# Patient Record
Sex: MALE | Race: WHITE | NOT HISPANIC OR LATINO | Employment: OTHER | URBAN - NONMETROPOLITAN AREA
[De-identification: names, ages, dates, MRNs, and addresses within clinical notes are randomized per-mention and may not be internally consistent; named-entity substitution may affect disease eponyms.]

---

## 2017-04-25 ENCOUNTER — HOSPITAL ENCOUNTER (OUTPATIENT)
Dept: CARDIAC REHAB | Facility: HOSPITAL | Age: 57
Setting detail: THERAPIES SERIES
End: 2017-04-25
Attending: FAMILY MEDICINE
Payer: COMMERCIAL

## 2017-04-25 VITALS — HEIGHT: 70 IN | BODY MASS INDEX: 19.76 KG/M2 | WEIGHT: 138 LBS

## 2017-04-25 PROCEDURE — G0424 PULMONARY REHAB W EXER: HCPCS

## 2017-04-25 ASSESSMENT — DUKE ACTIVITY SCORE INDEX (DASI)
VO2_PEAK: 23.45
DASI METS SCORE: 6.7

## 2017-04-25 ASSESSMENT — PULMONARY FUNCTION TESTS
FEV1/FVC: 33
FEV1: .97
FVC: 2.97
FEV1 (%PREDICTED): 26
FVC_PERCENT_PREDICTED: 61

## 2017-04-25 ASSESSMENT — 6 MINUTE WALK TEST (6MWT)
GENDER SELECTION: MALE
TOTAL DISTANCE WALKED: 356
MALE CALC: 635.39
FEMALE CALC: 563.27

## 2017-04-25 ASSESSMENT — ACTIVITIES OF DAILY LIVING (ADL): ADL_LIMITATIONS: STAIR CLIMBING;BATHING

## 2017-04-25 NOTE — PROGRESS NOTES
04/25/17 1200   Session   Session Initial Evaluation and Exercise Prescription   Certified through this date 05/24/17   Type Initial   General Information   Treatment Diagnosis COPD   Classification of COPD Stage 3 (Severe)   Onset Date 04/25/07   Hospital Location Not hospitalized   Current Signs and Symptoms SOB;Anxiety;Sleep   Comments Patient has sleep apnea, newly diagnosed   Outpatient Pulmonary Rehab Start Date 04/25/17   Primary Physician Young Souza   Pulmonolgist Keith Costello   General Information Comments Patient states he has scarring in his right lung   Medical History/Comorbidities   Medical History/Comorbidities Anxiety;COPD;Other (see comments);Hypertension;Insomnia;Obstructive sleep apnea  (Bad valve in his heart)   Untoward Events/Exacerbations/Hospitalizations   Untoward Events/Exacerbations/Hospitalizations None   Sputum   Sputum Production Amount small amount   Sputum Production Color Clear   Sputum Production Consistency Frothy   Tobacco History   Tobacco Current smoker, everyday   Tobacco Habit Cigarettes   Years Smoked 30   Average Packs Per Day .5   Interventions Planned Tobacco Cessation Aides;NA: Patient is in precontemplation for smoking   Medications   Short-Acting Beta Agonist Prescribed, taking as prescribed   Inhaled Corticosteroid Prescribed, taking as prescribed   Oral Corticosteroid Not prescribed   Medications Reconciled By Medical record;Patient   Preventative Vaccinations   Influenza Vaccination Yes   Pneumonia Vaccination No   Pain   Patient Currently in Pain No   Pain Comments Patient states he is not currently in any pain   Falls Screen   Have you fallen two or more times in the past year? No   Have you fallen and had an injury in the past year? No   Timed up and go score if applicable na   Referral initiated to physical therapy No   Comment Patient has not fallen nor is he a fall risk   Living and Work Status   Living Arrangements house   Support System Live with an  "adult   Environmental Factors Plants   ADL Limitations Stair climbing;Bathing   Initial Duke Activity Status Index (DASI) score. A measure of functional capacity. The goal is to have a pre-program raw score of 9.95 (~4 METs) or above 32.2   Initial DASI VO2 Peak (ml*kg-1*min-1) 23.45   Initial DASI MET Level 6.7   Occupation    Return to Employment No   Physical Assessments   Incisions Not applicable   Edema None   Left Lung Sounds diminished   Right Lung Sounds diminished   Comments Breath sounds are diminished but clear   Pulmonary Function Test (PFTs)   PFT Results Available   Date Completed 11/16/16   FVC Actual 2.97   % Predicted FVC 61   FEV1 Actual 0.97   % Predicted FEV1 26   FEV1/FEV Ratio 33   Pre/Post Bronchodilator Pre   Airway Obstruction Severe   Individualized Treatment Plan   Sessions Scheduled 18   Sessions Attended 1   Type Aerobic exercise;Resistance training;Flexibility training   Oxygen Use   Supplemental Oxygen Needed No   Interventions Recommended NA   Exercise Prescription   Mode Treadmill;Nustep;Arm Ergometer/UBE   Frequency 2 days/week   Duration/Time 30-45 min   THR (85% of age predicted max heart rate)  137.7   Effort Rating (0-10) 4-6/10   Progression of Exercise .5 METS as tolerated   Oxygen Titration with Exercise NA   Exercise Assessment   6 Minute Walk Predicted - Gender Selection Male   6 Minute Walk Predicted (Female) 563.27   6 Minute Walk Predicted (Male) 635.39   6 Minute Walk Distance (Initial) 356 Meters   Resting HR 93   Exercise    Resting /86   Exercise /80   SpO2 93   Exercise SpO2 86   Current MET level 2.7   Exercise Tolerance fair   Normal Limits Discussed Yes   Current Symptoms at Home Anxiety   Current Symptoms in Rehab Anxiety;Dyspnea   Limitations Normal limits discussed   Nutrition Management   Age 56   Height 1.778 m (5' 10\")   Weight 62.6 kg (138 lb)   BMI (Calculated) 19.84   Assessment Underweight   Goal weight 68 kg (150 " lb)   Interventions Planned Attend group nutrition class   Psychosocial   Initial Patient Health Questionnaire -9 (PHQ-9) for depression. To notify physician if pre-score >9. 2   Initial [facility] Survey score. A quality of life measure. To re evaluate if total score is > 25. Also consider PHQ-9 and DASI to determine if patient should be referred back to physician. 19   Initial Shortness of Breath Questionnaire (SOBQ) score. The goal is to reduce the score pre to post program. 52   Psychosocial Comments Patient lives with his wife and has a good support system   Stages of Change   Aerobic Exercise Contemplation   Physical Activity Contemplation   Recommended diet Contemplation   Stress Contemplation   Smoking Cessation Precontemplation   Oxygen Usage NA   Current Home Exercise   Type of Exercise None   Frequency (days per week) 0   Duration (minutes per session) 0   Recommended Home Exercise Prescription   Type of Exercise Walking   Frequency (Days per week) 3   Duration (minutes per session) 15-30 min   Effort Rating Recommended (0-10) Scale  4-6/10   Recommended Exercise Heart Rate Range 10 to 20 beats per minute above resting   30 Day Exercise Plan Advance 0.5 METs as tolerated by patient   Learning Assessment   Learner Patient   Primary Language English   Preferred Learning Style Listening;Demonstration   Barriers to Learning No barriers noted   Patient Education/Referrals   Education Recommended Advance Directives;Anatomy and Disease Process;Breathing Techniques;Emotional Aspects of CLD;Exercise Principles;Home Oxygen Equipment;Medication Overview;Nutrition;Panic and Anxiety   Referral(s) Recommended None   Follow-up/On-going Support   Provider follow-up needed on the following No follow-up needed   Pulmonary Rehab Goals   Pulmonary Rehab Goals 1;2;3   Goal 1   Goal Patient would like to be able to walk one mile without becoming short of breath   Target Date 07/25/17   Goal 2   Goal Patient would like to  gain 5 pounds   Target Date 07/25/17   Goal 3   Goal Patient would like to quit smoking   Target Date 07/25/17   Assessment   Assessment 4/25/2017 Keith is a patient who with this respiratory therapy she is hoping to increase his endurance for completing house hold activities without becoming so fatigued and short of breath.  According to the patient he has recently had another sleep study.  He does have JAYASHREE and has a home CPAP machine but does have difficulty sleeping.  According to patient he does have scarring on his right upper lobe.  Patient does continue to smoke but has cut down to .5 ppd from 1 ppd.  Patient will start respiratory therapy on May 2 to assist him with monitoring and assessment of O2 needs as well as additional education pertaining to CLD.   I have reviewed initial evaluation outcomes including patient's response to initial activity assessment, and agree with exercise prescription for pulmonary rehabilitation for this patient.

## 2017-05-02 ENCOUNTER — HOSPITAL ENCOUNTER (OUTPATIENT)
Dept: CARDIAC REHAB | Facility: HOSPITAL | Age: 57
Setting detail: THERAPIES SERIES
End: 2017-05-02
Attending: FAMILY MEDICINE
Payer: COMMERCIAL

## 2017-05-02 VITALS — WEIGHT: 135 LBS | BODY MASS INDEX: 19.37 KG/M2

## 2017-05-02 PROCEDURE — G0424 PULMONARY REHAB W EXER: HCPCS

## 2017-05-04 ENCOUNTER — HOSPITAL ENCOUNTER (OUTPATIENT)
Dept: CARDIAC REHAB | Facility: HOSPITAL | Age: 57
Setting detail: THERAPIES SERIES
End: 2017-05-04
Attending: FAMILY MEDICINE
Payer: COMMERCIAL

## 2017-05-04 VITALS — WEIGHT: 135 LBS | BODY MASS INDEX: 19.37 KG/M2

## 2017-05-04 PROCEDURE — G0424 PULMONARY REHAB W EXER: HCPCS

## 2017-05-09 ENCOUNTER — HOSPITAL ENCOUNTER (OUTPATIENT)
Dept: CARDIAC REHAB | Facility: HOSPITAL | Age: 57
Setting detail: THERAPIES SERIES
End: 2017-05-09
Attending: FAMILY MEDICINE
Payer: COMMERCIAL

## 2017-05-09 VITALS — BODY MASS INDEX: 19.23 KG/M2 | WEIGHT: 134 LBS

## 2017-05-09 PROCEDURE — G0424 PULMONARY REHAB W EXER: HCPCS

## 2017-05-11 ENCOUNTER — HOSPITAL ENCOUNTER (OUTPATIENT)
Dept: CARDIAC REHAB | Facility: HOSPITAL | Age: 57
Setting detail: THERAPIES SERIES
End: 2017-05-11
Attending: FAMILY MEDICINE
Payer: COMMERCIAL

## 2017-05-11 VITALS — WEIGHT: 135 LBS | BODY MASS INDEX: 19.37 KG/M2

## 2017-05-11 PROCEDURE — G0424 PULMONARY REHAB W EXER: HCPCS

## 2017-05-16 ENCOUNTER — HOSPITAL ENCOUNTER (OUTPATIENT)
Dept: CARDIAC REHAB | Facility: HOSPITAL | Age: 57
Setting detail: THERAPIES SERIES
End: 2017-05-16
Attending: FAMILY MEDICINE
Payer: COMMERCIAL

## 2017-05-16 VITALS — WEIGHT: 135 LBS | BODY MASS INDEX: 19.37 KG/M2

## 2017-05-16 PROCEDURE — G0424 PULMONARY REHAB W EXER: HCPCS

## 2017-05-23 ENCOUNTER — HOSPITAL ENCOUNTER (OUTPATIENT)
Dept: CARDIAC REHAB | Facility: HOSPITAL | Age: 57
Setting detail: THERAPIES SERIES
End: 2017-05-23
Attending: FAMILY MEDICINE
Payer: COMMERCIAL

## 2017-05-23 VITALS — BODY MASS INDEX: 19.23 KG/M2 | WEIGHT: 134 LBS

## 2017-05-23 PROCEDURE — G0424 PULMONARY REHAB W EXER: HCPCS

## 2017-05-25 ENCOUNTER — HOSPITAL ENCOUNTER (OUTPATIENT)
Dept: CARDIAC REHAB | Facility: HOSPITAL | Age: 57
Setting detail: THERAPIES SERIES
End: 2017-05-25
Attending: FAMILY MEDICINE
Payer: COMMERCIAL

## 2017-05-25 PROCEDURE — G0424 PULMONARY REHAB W EXER: HCPCS

## 2017-05-30 VITALS — HEIGHT: 70 IN | WEIGHT: 134 LBS | BODY MASS INDEX: 19.18 KG/M2

## 2017-05-30 ASSESSMENT — 6 MINUTE WALK TEST (6MWT)
MALE CALC: 638.59
FEMALE CALC: 567.44
GENDER SELECTION: MALE

## 2017-05-30 ASSESSMENT — DUKE ACTIVITY SCORE INDEX (DASI)
DASI METS SCORE: 6.7
VO2_PEAK: 23.45

## 2017-05-30 ASSESSMENT — PULMONARY FUNCTION TESTS
FEV1/FVC: 33
FEV1 (%PREDICTED): 26
FVC_PERCENT_PREDICTED: 61
FVC: 2.97
FEV1: .97

## 2017-05-30 ASSESSMENT — ACTIVITIES OF DAILY LIVING (ADL): ADL_LIMITATIONS: STAIR CLIMBING;BATHING

## 2017-05-30 NOTE — PROGRESS NOTES
05/30/17 0700   Session   Session 30 Day Individualized Treatment Plan   Certified through this date 06/28/17   Type Reassessment   General Information   Treatment Diagnosis COPD   Classification of COPD Stage 3 (Severe)   Onset Date 04/25/07   Hospital Location Not hospitalized   Current Signs and Symptoms SOB;Anxiety   Comments Patient has been having increased phlegm   Outpatient Pulmonary Rehab Start Date 04/25/17   Primary Physician Young Souza   Pulmonolgist Keith Costello   General Information Comments Patient states he has scarring in his right lung   Medical History/Comorbidities   Medical History/Comorbidities Anxiety;COPD;Other (see comments);Hypertension;Insomnia;Obstructive sleep apnea   Untoward Events/Exacerbations/Hospitalizations   Untoward Events/Exacerbations/Hospitalizations None   Sputum   Sputum Production Amount small amount   Sputum Production Color Clear   Sputum Production Consistency Thick   Tobacco History   Tobacco Current smoker, everyday   Tobacco Habit Cigarettes   Years Smoked 30   Average Packs Per Day .5   Interventions Planned Tobacco Cessation Aides;NA: Patient is in precontemplation for smoking   Medications   Short-Acting Beta Agonist Prescribed, taking as prescribed   Long-Acting Anticholinergic Not prescribed   Short-Acting Anticholinergic Not prescribed   Inhaled Corticosteroid Prescribed, taking as prescribed   Oral Corticosteroid Not prescribed   Medications Reconciled By Medical record;Patient   Preventative Vaccinations   Influenza Vaccination Yes   Pneumonia Vaccination No   Pain   Patient Currently in Pain No   Pain Comments Patient is not currently in pain   Falls Screen   Have you fallen two or more times in the past year? No   Have you fallen and had an injury in the past year? No   Timed up and go score if applicable na   Referral initiated to physical therapy No   Comment Patient is not a fall risk nor is he afraid of falling   Living and Work Status  "  Living Arrangements house   Support System Live with an adult   Environmental Factors Plants   ADL Limitations Stair climbing;Bathing   Initial Duke Activity Status Index (DASI) score. A measure of functional capacity. The goal is to have a pre-program raw score of 9.95 (~4 METs) or above 32.2   Initial DASI VO2 Peak (ml*kg-1*min-1) 23.45   Initial DASI MET Level 6.7   Occupation    Return to Employment No   Physical Assessments   Incisions Not applicable   Edema None   Left Lung Sounds diminished   Right Lung Sounds diminished   Comments Breath sounds are diminished but clear   Pulmonary Function Test (PFTs)   PFT Results Available   Date Completed 11/16/16   FVC Actual 2.97   % Predicted FVC 61   FEV1 Actual 0.97   % Predicted FEV1 26   FEV1/FEV Ratio 33   Pre/Post Bronchodilator Pre   Airway Obstruction Severe   Individualized Treatment Plan   Sessions Scheduled 18   Sessions Attended 8   Type Aerobic exercise;Resistance training;Flexibility training   Oxygen Use   Supplemental Oxygen Needed No   Interventions Recommended NA   Exercise Prescription   Mode Treadmill;Nustep;Arm Ergometer/UBE   Frequency 2 days/week   Duration/Time 30-45 min   THR (85% of age predicted max heart rate)  137.7   Effort Rating (0-10) 4-6/10   Progression of Exercise .5 METS as tolerated   Oxygen Titration with Exercise NA   Exercise Assessment   6 Minute Walk Predicted - Gender Selection Male   6 Minute Walk Predicted (Female) 567.44   6 Minute Walk Predicted (Male) 638.59   Resting HR 99   Exercise    Resting /82   Exercise /80   SpO2 98   Exercise SpO2 92   Current MET level 2.7   Exercise Tolerance fair   Normal Limits Discussed Yes   Current Symptoms at Home Anxiety   Current Symptoms in Rehab Anxiety;Dyspnea   Limitations Normal limits discussed   Nutrition Management   Age 56   Height 1.778 m (5' 10\")   Weight 60.8 kg (134 lb)   BMI (Calculated) 19.27   Assessment Underweight   Goal weight " 68 kg (150 lb)   Interventions Planned Attend group nutrition class   Psychosocial   Initial Patient Health Questionnaire -9 (PHQ-9) for depression. To notify physician if pre-score >9. 2   Initial [facility] [place] Survey score. A quality of life measure. To re evaluate if total score is > 25. Also consider PHQ-9 and DASI to determine if patient should be referred back to physician. 19   Initial Shortness of Breath Questionnaire (SOBQ) score. The goal is to reduce the score pre to post program. 52   Psychosocial Comments Patient lives with his wife and has a good support system   Stages of Change   Aerobic Exercise Contemplation   Physical Activity Contemplation   Recommended diet Contemplation   Stress Contemplation   Smoking Cessation Precontemplation   Oxygen Usage NA   Current Home Exercise   Type of Exercise None   Frequency (days per week) 0   Duration (minutes per session) 0   Recommended Home Exercise Prescription   Type of Exercise Walking   Frequency (Days per week) 3   Duration (minutes per session) 15-30 min   Effort Rating Recommended (0-10) Scale  4-6/10   Recommended Exercise Heart Rate Range 10-20 BPM above resting   30 Day Exercise Plan Advance 0.5 METs as tolerated by patient   Learning Assessment   Learner Patient   Primary Language English   Preferred Learning Style Listening;Demonstration   Barriers to Learning No barriers noted   Patient Education/Referrals   Education Recommended Advance Directives;Anatomy and Disease Process;Breathing Techniques;Emotional Aspects of CLD;Exercise Principles;Home Oxygen Equipment;Medication Overview;Nutrition;Panic and Anxiety   Referral(s) Recommended None   Follow-up/On-going Support   Provider follow-up needed on the following No follow-up needed   Pulmonary Rehab Goals   Pulmonary Rehab Goals 1;2;3   Goal 1   Goal Patient would like to be able to walk one mile without becoming short of breath   Target Date 07/25/17   Progress Towards Goal Patient has not  started exercising at home yet   Goal 2   Goal Patient would like to gain 5 pounds   Target Date 07/25/17   Progress Towards Goal Patient has lost one pound since starting PUlmonary Rehab   Goal 3   Goal Patient would like to quit smoking   Target Date 07/25/17   Progress Towards Goal Patient is still smoking a half pack per day   Assessment   Assessment 5/30/2017 Keith has attended 8 of his 18 exercise sessions and no education sessions.  He has not quit smoking yet nor has he started his home exercise.  He has difficulty with pursed lip breathing due to having a deviated septum.  Patient states he has more difficulty breathing with the humid weather and has increased thicker phlegm.  Patient has not attended any education sessions.  Patient will need to continue with Respiratory therapy to reinforce good breathing techniques as well as encourage attending education sessions.  Patient states he is not ready to quit smoking.  Skilled therapy is still needed to monitor O2 while exercising as well as reinforce breathing techniques.   I have reviewed and agree with this patient s individual treatment plan and exercise prescription for pulmonary rehabilitation.  Please see  individual treatment plan for details of progress and plan.

## 2017-06-13 ENCOUNTER — HOSPITAL ENCOUNTER (OUTPATIENT)
Dept: CARDIAC REHAB | Facility: HOSPITAL | Age: 57
Setting detail: THERAPIES SERIES
End: 2017-06-13
Attending: FAMILY MEDICINE
Payer: COMMERCIAL

## 2017-06-13 VITALS — WEIGHT: 134 LBS | BODY MASS INDEX: 19.23 KG/M2

## 2017-06-13 PROCEDURE — G0424 PULMONARY REHAB W EXER: HCPCS

## 2017-06-20 ENCOUNTER — HOSPITAL ENCOUNTER (OUTPATIENT)
Dept: CARDIAC REHAB | Facility: HOSPITAL | Age: 57
Setting detail: THERAPIES SERIES
End: 2017-06-20
Attending: FAMILY MEDICINE
Payer: COMMERCIAL

## 2017-06-20 VITALS — WEIGHT: 134 LBS | BODY MASS INDEX: 19.23 KG/M2

## 2017-06-20 PROCEDURE — G0424 PULMONARY REHAB W EXER: HCPCS

## 2017-06-22 ENCOUNTER — HOSPITAL ENCOUNTER (OUTPATIENT)
Dept: CARDIAC REHAB | Facility: HOSPITAL | Age: 57
Setting detail: THERAPIES SERIES
End: 2017-06-22
Attending: FAMILY MEDICINE
Payer: COMMERCIAL

## 2017-06-22 VITALS — WEIGHT: 134 LBS | BODY MASS INDEX: 19.23 KG/M2

## 2017-06-22 PROCEDURE — G0424 PULMONARY REHAB W EXER: HCPCS

## 2017-06-27 ENCOUNTER — HOSPITAL ENCOUNTER (OUTPATIENT)
Dept: CARDIAC REHAB | Facility: HOSPITAL | Age: 57
Setting detail: THERAPIES SERIES
End: 2017-06-27
Attending: FAMILY MEDICINE
Payer: COMMERCIAL

## 2017-06-27 VITALS — WEIGHT: 134 LBS | BODY MASS INDEX: 19.23 KG/M2

## 2017-06-27 PROCEDURE — G0424 PULMONARY REHAB W EXER: HCPCS

## 2017-06-29 ENCOUNTER — HOSPITAL ENCOUNTER (OUTPATIENT)
Dept: CARDIAC REHAB | Facility: HOSPITAL | Age: 57
Setting detail: THERAPIES SERIES
End: 2017-06-29
Attending: FAMILY MEDICINE
Payer: COMMERCIAL

## 2017-06-29 VITALS — WEIGHT: 134 LBS | HEIGHT: 70 IN | BODY MASS INDEX: 19.18 KG/M2

## 2017-06-29 PROCEDURE — G0424 PULMONARY REHAB W EXER: HCPCS

## 2017-06-29 ASSESSMENT — DUKE ACTIVITY SCORE INDEX (DASI)
TOTAL_SCORE: 24.2
DASI METS SCORE: 6.7
DASI METS SCORE: 5.72
VO2_PEAK: 20.01
VO2_PEAK: 23.45

## 2017-06-29 ASSESSMENT — PULMONARY FUNCTION TESTS
FEV1 (%PREDICTED): 26
FVC: 2.97
FVC_PERCENT_PREDICTED: 61
FEV1: .97
FEV1/FVC: 33

## 2017-06-29 ASSESSMENT — 6 MINUTE WALK TEST (6MWT)
MALE CALC: 638.59
FEMALE CALC: 567.44
TOTAL DISTANCE WALKED: 356
TOTAL DISTANCE WALKED: 402
GENDER SELECTION: MALE

## 2017-06-29 ASSESSMENT — ACTIVITIES OF DAILY LIVING (ADL): ADL_LIMITATIONS: STAIR CLIMBING

## 2017-06-29 NOTE — PROGRESS NOTES
06/29/17 1300   Session   Session Discharge Note   Certified through this date 07/28/17   Type Discharge/Follow-up   General Information   Treatment Diagnosis COPD   Classification of COPD Stage 3 (Severe)   Onset Date 04/25/07   Hospital Location Not hospitalized   Current Signs and Symptoms SOB   Comments Patient still has increased phlegm    Outpatient Pulmonary Rehab Start Date 04/25/17   Primary Physician Young Souza   Pulmonolgist Keith Costello   General Information Comments Patient states he has scarring in his right lung   Medical History/Comorbidities   Medical History/Comorbidities Anxiety;COPD;Other (see comments);Hypertension;Insomnia;Obstructive sleep apnea   Untoward Events/Exacerbations/Hospitalizations   Untoward Events/Exacerbations/Hospitalizations None   Sputum   Sputum Production Amount small amount   Sputum Production Color White;Clear   Sputum Production Consistency Thick   Tobacco History   Tobacco Current smoker, everyday   Tobacco Habit Cigarettes   Years Smoked 30   Average Packs Per Day .5   Quit Date or Planned Quit Date 07/04/17   Interventions Planned Develop Action Plan   Medications   Short-Acting Beta Agonist Prescribed, taking as prescribed   Long-Acting Anticholinergic Not prescribed   Short-Acting Anticholinergic Not prescribed   Inhaled Corticosteroid Prescribed, taking as prescribed   Oral Corticosteroid Not prescribed   Medications Reconciled By Medical record;Patient   Preventative Vaccinations   Influenza Vaccination Yes   Pneumonia Vaccination No   Pain   Patient Currently in Pain No   Pain Comments Patient is not currently in pain   Falls Screen   Have you fallen two or more times in the past year? No   Have you fallen and had an injury in the past year? No   Timed up and go score if applicable na   Sit to stand score if applicable .   Referral initiated to physical therapy No   Comment Patient is not a fall risk nor is he afraid of falling   Living and Work Status    Living Arrangements house   Support System Live with an adult   Environmental Factors Plants   ADL Limitations Stair climbing   Initial Duke Activity Status Index (DASI) score. A measure of functional capacity. The goal is to have a pre-program raw score of 9.95 (~4 METs) or above 32.2   Initial DASI VO2 Peak (ml*kg-1*min-1) 23.45   Initial DASI MET Level 6.7   Discharge DASI score 24.2   Discharge DASI VO2 Peak 20.01   Discharge DASI MET Level 5.72   DASI Comments/Recommendations Patient states that you can perceive your answers differently now compared to at the beginning of Rehab   Occupation    Return to Employment Retired;No   Physical Assessments   Incisions Not applicable   Edema None   Left Lung Sounds diminished   Right Lung Sounds diminished   Comments Breath sounds are diminished but clear   Pulmonary Function Test (PFTs)   PFT Results Available   Date Completed 11/16/16   FVC Actual 2.97   % Predicted FVC 61   FEV1 Actual 0.97   % Predicted FEV1 26   FEV1/FEV Ratio 33   Pre/Post Bronchodilator Pre   Airway Obstruction Severe   Individualized Treatment Plan   Sessions Scheduled 18   Sessions Attended 12   Type Aerobic exercise;Resistance training;Flexibility training   Oxygen Use   Supplemental Oxygen Needed No   Interventions Recommended NA   Exercise Prescription   Mode Treadmill;Nustep;Arm Ergometer/UBE   Frequency 2 days/week   Duration/Time 30-45 min   THR (85% of age predicted max heart rate)  137.7   Effort Rating (0-10) 4-6/10   Progression of Exercise .5 METS as tolerated   Oxygen Titration with Exercise NA   Comments Patient has not implemented a home exercise program but strongly encouraged.   Exercise Assessment   6 Minute Walk Predicted - Gender Selection Male   6 Minute Walk Predicted (Female) 567.44   6 Minute Walk Predicted (Male) 638.59   6 Minute Walk Distance (Initial) 356 Meters   6-min Walk Distance (Discharge) 402 Meters   Resting HR 94   Exercise   "  Resting /74   Exercise /78   SpO2 97   Exercise SpO2 91   Current MET level 2.93   Exercise Tolerance fair   Normal Limits Discussed Yes   Current Symptoms at Home Anxiety   Current Symptoms in Rehab Anxiety;Dyspnea   Limitations Normal limits discussed   Nutrition Management   Age 56   Height 1.778 m (5' 10\")   Weight 60.8 kg (134 lb)   BMI (Calculated) 19.27   Assessment Underweight   Goal weight 68 kg (150 lb)   Interventions Planned Attend group nutrition class   Psychosocial   Initial Patient Health Questionnaire -9 (PHQ-9) for depression. To notify physician if pre-score >9. 2   Initial [employer] Survey score. A quality of life measure. To re evaluate if total score is > 25. Also consider PHQ-9 and DASI to determine if patient should be referred back to physician. 19   Initial Shortness of Breath Questionnaire (SOBQ) score. The goal is to reduce the score pre to post program. 52   Discharge PHQ-9 for depression 2   Discharge [employer] Survey Score 21   Discharge SOBQ Score 52   Intervention Planned Use breathing techniques to control dyspnea cycle;Introduce relaxation techniques to assist with decreased anxiety   Interventions Completed Demonstrated ability to apply breathing techniques to control dyspnea cycle   Psychosocial Comments Patient lives with his wife and has a good support system   Stages of Change   Aerobic Exercise Contemplation   Physical Activity Preparation   Recommended diet Contemplation   Stress Contemplation   Smoking Cessation Preparation   Oxygen Usage NA   Current Home Exercise   Type of Exercise None   Frequency (days per week) 0   Duration (minutes per session) 0   Recommended Home Exercise Prescription   Type of Exercise Walking   Frequency (Days per week) 3   Duration (minutes per session) 15-30 min   Effort Rating Recommended (0-10) Scale  4-6/10   Recommended Exercise Heart Rate Range 10-20 bpm   Learning Assessment   Learner Patient   Primary Language " English   Preferred Learning Style Listening;Demonstration   Barriers to Learning No barriers noted   Patient Education/Referrals   Education Recommended Advance Directives;Anatomy and Disease Process;Breathing Techniques;Emotional Aspects of CLD;Exercise Principles;Home Oxygen Equipment;Medication Overview;Nutrition;Panic and Anxiety   Referral(s) Recommended None   Follow-up/On-going Support   Provider follow-up needed on the following No follow-up needed   Pulmonary Rehab Goals   Pulmonary Rehab Goals 1;2;3   Goal 1   Goal Patient would like to be able to walk one mile without becoming short of breath   Target Date 07/25/17   Date Met 06/29/17   Progress Towards Goal Patient feels like he could walk one mile at his own pace   Goal 2   Goal Patient would like to gain 5 pounds   Target Date 07/25/17   Progress Towards Goal Patient lost one pound during pulmonary rehab.  He is considering taking boost for a few extra calories   Goal 3   Goal Patient would like to quit smoking   Target Date 07/25/17   Progress Towards Goal Patient is currently still smoking one half pack per day but did set a quit date of 7/4/2017    Assessment   Assessment 6/29/2017 Keith has attended 13 of his 18 sessions but has requested to be discharged due to the fact that he will be retiring in 3 days and his insurance will be changing.  He did not attend any education classes while in rehab nor did he start doing exercises at home.  Setting up a home exercise program has been strongly encouraged.  Handouts were given on aerobic exercise, muscle conditioning, as well as stretches.  Patient has continued to smoke during pulmonary rehab but has set a quit date for July 4th, 2017 which is an improvement from beginning of rehab when patient did not want to quit smoking.  Patient states he will consider the WEL program and was given handouts and our phone number to call if he would like to start.  Patient did state he is not afraid to go to the  grocery store anymore and can control his breathing cycle through pursed lip breathing.     I have reviewed this patient s outcomes and self report of symptoms.  The patient has completed Pulmonary Rehabilitation and has made appropriate progress towards goals.  Please see individual treatment plan for details of attainment, discharge plan and independent exercise prescription.

## 2017-12-19 ENCOUNTER — TRANSFERRED RECORDS (OUTPATIENT)
Dept: HEALTH INFORMATION MANAGEMENT | Facility: HOSPITAL | Age: 57
End: 2017-12-19

## 2018-01-12 RX ORDER — BUDESONIDE AND FORMOTEROL FUMARATE DIHYDRATE 160; 4.5 UG/1; UG/1
2 AEROSOL RESPIRATORY (INHALATION) 2 TIMES DAILY
COMMUNITY

## 2018-01-12 RX ORDER — TIOTROPIUM BROMIDE 18 UG/1
18 CAPSULE ORAL; RESPIRATORY (INHALATION) DAILY
COMMUNITY

## 2018-01-12 RX ORDER — TAMSULOSIN HYDROCHLORIDE 0.4 MG/1
0.4 CAPSULE ORAL DAILY
COMMUNITY

## 2018-01-12 RX ORDER — ALBUTEROL SULFATE 5 MG/ML
2.5 SOLUTION RESPIRATORY (INHALATION) EVERY 6 HOURS PRN
COMMUNITY

## 2018-01-12 RX ORDER — ROFLUMILAST 500 UG/1
500 TABLET ORAL DAILY
COMMUNITY

## 2018-01-12 RX ORDER — ALBUTEROL SULFATE 90 UG/1
2 AEROSOL, METERED RESPIRATORY (INHALATION) EVERY 6 HOURS
COMMUNITY

## 2018-01-16 ENCOUNTER — ANESTHESIA EVENT (OUTPATIENT)
Dept: SURGERY | Facility: HOSPITAL | Age: 58
End: 2018-01-16
Payer: COMMERCIAL

## 2018-01-16 ASSESSMENT — LIFESTYLE VARIABLES: TOBACCO_USE: 1

## 2018-01-16 ASSESSMENT — COPD QUESTIONNAIRES: COPD: 1

## 2018-01-16 NOTE — ANESTHESIA PREPROCEDURE EVALUATION
Anesthesia Evaluation     . Pt has had prior anesthetic.     No history of anesthetic complications          ROS/MED HX    ENT/Pulmonary: Comment: Was on oxygen during respiratory illness in Nov and Dec.    (+)sleep apnea, JAYASHREE risk factors hypertension, tobacco use, Past use moderate COPD, doesn't use CPAP , . Other pulmonary disease emphysema.    Neurologic:  - neg neurologic ROS     Cardiovascular:     (+) Dyslipidemia, hypertension-range: took beta blocker last night, ---. : . . . :. valvular problems/murmurs type: MR moderate:. Previous cardiac testing Echodate:11/13results:EF= 60%date: results: date: results: date: results:          METS/Exercise Tolerance: Comment: Depends on the day    Hematologic:  - neg hematologic  ROS       Musculoskeletal:   (+) , , other musculoskeletal- degenerative lumbo sacral disease      GI/Hepatic:     (+) bowel prep, Other GI/Hepatic ETOHism      Renal/Genitourinary:  - ROS Renal section negative       Endo:  - neg endo ROS       Psychiatric:  - neg psychiatric ROS       Infectious Disease:  - neg infectious disease ROS       Malignancy:      - no malignancy   Other:    - neg other ROS                 Physical Exam      Airway   Mallampati: III  TM distance: <3 FB  Neck ROM: limited    Dental   (+) missing    Cardiovascular   Rhythm and rate: normal  (+) murmur       Pulmonary (+) decreased breath sounds                       Anesthesia Plan      History & Physical Review  History and physical reviewed and following examination; no interval change.    ASA Status:  3 .        Plan for MAC with Intravenous and Propofol induction. Maintenance will be TIVA.  Reason for MAC:  Deep or markedly invasive procedure (G8) and Chronic cardiopulmonary disease (G9)    Risks and benefits of MAC anesthetic discussed including dental damage, aspiration, loss of airway, conversion to general anesthetic, CV complications, MI, stroke, death. Pt wishes to proceed.       Postoperative  Care  Postoperative pain management:  IV analgesics.      Consents  Anesthetic plan, risks, benefits and alternatives discussed with:  Patient..                          .

## 2018-01-19 ENCOUNTER — HOSPITAL ENCOUNTER (OUTPATIENT)
Facility: HOSPITAL | Age: 58
Discharge: HOME | End: 2018-01-19
Attending: FAMILY MEDICINE | Admitting: FAMILY MEDICINE
Payer: COMMERCIAL

## 2018-01-19 ENCOUNTER — ANESTHESIA (OUTPATIENT)
Dept: SURGERY | Facility: HOSPITAL | Age: 58
End: 2018-01-19
Payer: COMMERCIAL

## 2018-01-19 ENCOUNTER — SURGERY (OUTPATIENT)
Age: 58
End: 2018-01-19

## 2018-01-19 VITALS
DIASTOLIC BLOOD PRESSURE: 78 MMHG | WEIGHT: 131.8 LBS | TEMPERATURE: 97 F | SYSTOLIC BLOOD PRESSURE: 136 MMHG | BODY MASS INDEX: 18.87 KG/M2 | HEIGHT: 70 IN | RESPIRATION RATE: 18 BRPM | OXYGEN SATURATION: 95 %

## 2018-01-19 PROCEDURE — 27210794 ZZH OR GENERAL SUPPLY STERILE: Performed by: FAMILY MEDICINE

## 2018-01-19 PROCEDURE — 01999 UNLISTED ANES PROCEDURE: CPT | Performed by: NURSE ANESTHETIST, CERTIFIED REGISTERED

## 2018-01-19 PROCEDURE — 88305 TISSUE EXAM BY PATHOLOGIST: CPT | Mod: TC | Performed by: FAMILY MEDICINE

## 2018-01-19 PROCEDURE — 25000128 H RX IP 250 OP 636: Performed by: NURSE ANESTHETIST, CERTIFIED REGISTERED

## 2018-01-19 PROCEDURE — 36000052 ZZH SURGERY LEVEL 2 EA 15 ADDTL MIN: Performed by: FAMILY MEDICINE

## 2018-01-19 PROCEDURE — 36000050 ZZH SURGERY LEVEL 2 1ST 30 MIN: Performed by: FAMILY MEDICINE

## 2018-01-19 PROCEDURE — 93010 ELECTROCARDIOGRAM REPORT: CPT | Performed by: INTERNAL MEDICINE

## 2018-01-19 PROCEDURE — 25000125 ZZHC RX 250: Performed by: NURSE ANESTHETIST, CERTIFIED REGISTERED

## 2018-01-19 PROCEDURE — 71000027 ZZH RECOVERY PHASE 2 EACH 15 MINS: Performed by: FAMILY MEDICINE

## 2018-01-19 PROCEDURE — 37000008 ZZH ANESTHESIA TECHNICAL FEE, 1ST 30 MIN: Performed by: FAMILY MEDICINE

## 2018-01-19 PROCEDURE — 40000305 ZZH STATISTIC PRE PROC ASSESS I: Performed by: FAMILY MEDICINE

## 2018-01-19 PROCEDURE — 37000009 ZZH ANESTHESIA TECHNICAL FEE, EACH ADDTL 15 MIN: Performed by: FAMILY MEDICINE

## 2018-01-19 PROCEDURE — 93005 ELECTROCARDIOGRAM TRACING: CPT | Mod: 59

## 2018-01-19 RX ORDER — NALOXONE HYDROCHLORIDE 0.4 MG/ML
.1-.4 INJECTION, SOLUTION INTRAMUSCULAR; INTRAVENOUS; SUBCUTANEOUS
Status: DISCONTINUED | OUTPATIENT
Start: 2018-01-19 | End: 2018-01-19 | Stop reason: HOSPADM

## 2018-01-19 RX ORDER — ONDANSETRON 2 MG/ML
4 INJECTION INTRAMUSCULAR; INTRAVENOUS EVERY 30 MIN PRN
Status: DISCONTINUED | OUTPATIENT
Start: 2018-01-19 | End: 2018-01-19 | Stop reason: HOSPADM

## 2018-01-19 RX ORDER — NALOXONE HYDROCHLORIDE 0.4 MG/ML
.1-.4 INJECTION, SOLUTION INTRAMUSCULAR; INTRAVENOUS; SUBCUTANEOUS
Status: CANCELLED | OUTPATIENT
Start: 2018-01-19 | End: 2018-01-20

## 2018-01-19 RX ORDER — ONDANSETRON 4 MG/1
4 TABLET, ORALLY DISINTEGRATING ORAL EVERY 30 MIN PRN
Status: DISCONTINUED | OUTPATIENT
Start: 2018-01-19 | End: 2018-01-19 | Stop reason: HOSPADM

## 2018-01-19 RX ORDER — SODIUM CHLORIDE, SODIUM LACTATE, POTASSIUM CHLORIDE, CALCIUM CHLORIDE 600; 310; 30; 20 MG/100ML; MG/100ML; MG/100ML; MG/100ML
INJECTION, SOLUTION INTRAVENOUS CONTINUOUS
Status: DISCONTINUED | OUTPATIENT
Start: 2018-01-19 | End: 2018-01-19 | Stop reason: HOSPADM

## 2018-01-19 RX ORDER — LIDOCAINE 40 MG/G
CREAM TOPICAL
Status: DISCONTINUED | OUTPATIENT
Start: 2018-01-19 | End: 2018-01-19 | Stop reason: HOSPADM

## 2018-01-19 RX ORDER — LIDOCAINE HYDROCHLORIDE 20 MG/ML
INJECTION, SOLUTION INFILTRATION; PERINEURAL PRN
Status: DISCONTINUED | OUTPATIENT
Start: 2018-01-19 | End: 2018-01-19

## 2018-01-19 RX ORDER — FLUMAZENIL 0.1 MG/ML
0.2 INJECTION, SOLUTION INTRAVENOUS
Status: CANCELLED | OUTPATIENT
Start: 2018-01-19 | End: 2018-01-19

## 2018-01-19 RX ORDER — MEPERIDINE HYDROCHLORIDE 25 MG/ML
12.5 INJECTION INTRAMUSCULAR; INTRAVENOUS; SUBCUTANEOUS
Status: DISCONTINUED | OUTPATIENT
Start: 2018-01-19 | End: 2018-01-19 | Stop reason: HOSPADM

## 2018-01-19 RX ORDER — PROPOFOL 10 MG/ML
INJECTION, EMULSION INTRAVENOUS PRN
Status: DISCONTINUED | OUTPATIENT
Start: 2018-01-19 | End: 2018-01-19

## 2018-01-19 RX ADMIN — PROPOFOL 10 MG: 10 INJECTION, EMULSION INTRAVENOUS at 08:59

## 2018-01-19 RX ADMIN — PROPOFOL 10 MG: 10 INJECTION, EMULSION INTRAVENOUS at 08:39

## 2018-01-19 RX ADMIN — PROPOFOL 10 MG: 10 INJECTION, EMULSION INTRAVENOUS at 08:32

## 2018-01-19 RX ADMIN — PROPOFOL 20 MG: 10 INJECTION, EMULSION INTRAVENOUS at 08:11

## 2018-01-19 RX ADMIN — PROPOFOL 20 MG: 10 INJECTION, EMULSION INTRAVENOUS at 08:12

## 2018-01-19 RX ADMIN — PROPOFOL 10 MG: 10 INJECTION, EMULSION INTRAVENOUS at 08:27

## 2018-01-19 RX ADMIN — PROPOFOL 10 MG: 10 INJECTION, EMULSION INTRAVENOUS at 08:29

## 2018-01-19 RX ADMIN — PROPOFOL 10 MG: 10 INJECTION, EMULSION INTRAVENOUS at 08:43

## 2018-01-19 RX ADMIN — PROPOFOL 10 MG: 10 INJECTION, EMULSION INTRAVENOUS at 08:57

## 2018-01-19 RX ADMIN — PROPOFOL 10 MG: 10 INJECTION, EMULSION INTRAVENOUS at 08:45

## 2018-01-19 RX ADMIN — PROPOFOL 10 MG: 10 INJECTION, EMULSION INTRAVENOUS at 08:49

## 2018-01-19 RX ADMIN — PROPOFOL 10 MG: 10 INJECTION, EMULSION INTRAVENOUS at 08:23

## 2018-01-19 RX ADMIN — PROPOFOL 10 MG: 10 INJECTION, EMULSION INTRAVENOUS at 08:51

## 2018-01-19 RX ADMIN — SODIUM CHLORIDE, POTASSIUM CHLORIDE, SODIUM LACTATE AND CALCIUM CHLORIDE: 600; 310; 30; 20 INJECTION, SOLUTION INTRAVENOUS at 08:00

## 2018-01-19 RX ADMIN — PROPOFOL 10 MG: 10 INJECTION, EMULSION INTRAVENOUS at 08:22

## 2018-01-19 RX ADMIN — PROPOFOL 10 MG: 10 INJECTION, EMULSION INTRAVENOUS at 08:55

## 2018-01-19 RX ADMIN — PROPOFOL 10 MG: 10 INJECTION, EMULSION INTRAVENOUS at 08:25

## 2018-01-19 RX ADMIN — PROPOFOL 30 MG: 10 INJECTION, EMULSION INTRAVENOUS at 08:10

## 2018-01-19 RX ADMIN — PROPOFOL 20 MG: 10 INJECTION, EMULSION INTRAVENOUS at 08:15

## 2018-01-19 RX ADMIN — PROPOFOL 10 MG: 10 INJECTION, EMULSION INTRAVENOUS at 08:41

## 2018-01-19 RX ADMIN — PROPOFOL 10 MG: 10 INJECTION, EMULSION INTRAVENOUS at 08:35

## 2018-01-19 RX ADMIN — PROPOFOL 20 MG: 10 INJECTION, EMULSION INTRAVENOUS at 08:13

## 2018-01-19 RX ADMIN — PROPOFOL 10 MG: 10 INJECTION, EMULSION INTRAVENOUS at 08:47

## 2018-01-19 RX ADMIN — PROPOFOL 10 MG: 10 INJECTION, EMULSION INTRAVENOUS at 08:53

## 2018-01-19 RX ADMIN — PROPOFOL 10 MG: 10 INJECTION, EMULSION INTRAVENOUS at 08:37

## 2018-01-19 RX ADMIN — PROPOFOL 20 MG: 10 INJECTION, EMULSION INTRAVENOUS at 08:19

## 2018-01-19 RX ADMIN — PROPOFOL 20 MG: 10 INJECTION, EMULSION INTRAVENOUS at 08:20

## 2018-01-19 RX ADMIN — PROPOFOL 20 MG: 10 INJECTION, EMULSION INTRAVENOUS at 08:17

## 2018-01-19 RX ADMIN — LIDOCAINE HYDROCHLORIDE 40 MG: 20 INJECTION, SOLUTION INFILTRATION; PERINEURAL at 08:09

## 2018-01-19 RX ADMIN — PROPOFOL 20 MG: 10 INJECTION, EMULSION INTRAVENOUS at 08:21

## 2018-01-19 RX ADMIN — SODIUM CHLORIDE, POTASSIUM CHLORIDE, SODIUM LACTATE AND CALCIUM CHLORIDE: 600; 310; 30; 20 INJECTION, SOLUTION INTRAVENOUS at 07:58

## 2018-01-19 RX ADMIN — PROPOFOL 20 MG: 10 INJECTION, EMULSION INTRAVENOUS at 09:01

## 2018-01-19 ASSESSMENT — COPD QUESTIONNAIRES: CAT_SEVERITY: MODERATE

## 2018-01-19 NOTE — OP NOTE
[facility] Colonoscopy Operative Note     PROCEDURES:  Colonoscopy    PREOPERATIVE DIAGNOSIS:    1.  Colorectal cancer screening   2.  Positive F.I.T.     POSTOPERATIVE DIAGNOSIS:    1.  Colorectal cancer screening   2.  Positive F.I.T.   3.    ID Type Source Tests Collected by Time Destination   A : Colon Polyps x 2 at 65cm  Polyp Other SURGICAL PATHOLOGY EXAM Young Souza MD 1/19/2018  8:34 AM    B : Colon Polyp at 55 cm Polyp Other SURGICAL PATHOLOGY EXAM Young Souza MD 1/19/2018  8:51 AM    C : Colon Polyp at 20 cm  Polyp Other SURGICAL PATHOLOGY EXAM Young Souza MD 1/19/2018  8:58 AM    D : Colon Polyp at 18 cm x 3, two retrieved Polyp Other SURGICAL PATHOLOGY EXAM Young Souza MD 1/19/2018  8:59 AM    E : Rectal Polyp x 4 Polyp Other SURGICAL PATHOLOGY EXAM Young Souza MD 1/19/2018  9:07 AM    4.  Grade 1 internal hemorrhoids and external hemorrhoidal skin tags       ANESTHESIA:  MAC  ESTIMATED BLOOD LOSS: None  FLUIDS: See anesthesia record  COMPLICATIONS: None     DESCRIPTION OF PROCEDURE:  Keith Ingram is a 57 year old male who presents for screening colonoscopy.  He denies changes to his bowel habits including melena, hematochezia, nausea, emesis, abdominal pain or unexplained weight loss.   He denies FHX of colon cancer.    On the day prior to the procedure the patient underwent Suprep with satisfactory evacuation.  On the day of the procedure the patient was brought back to the procedure room where he was placed in the left lateral recumbent position.  IV monitored anesthesia was initiated with Local with MAC.  Before beginning colonoscopy a rectal exam was performed and was external hemorrhoidal skin tags.      Following rectal exam colonoscope was introduced into the rectum and advanced toward the ileocecal junction with intermittent insufflation.  The cecum was reached and well visualized.  At this point the colonoscope was withdrawn ensuring  adequate visualization of the lumen and bowel wall.  Findings were unremarkable throughout the ascending and transverse colon.  At 65 cm (descending colon)  two less than 1 cm polyp(s) were identified and removed, one with hot snare and the other with hot forceps. The polyps that was removed with the hot forceps was fulgurated and the specimen(s) was retrieved and hemostasis was satisfactory.  The polyp that was removed with the snare required a hemoclip for hemostasis. The specimen(s) was retrieved and hemostasis was found to be satisfactory. The colonoscope was further withdrawn at this point and at 55 cm (descending colon) a less than 1 cm polyp(s) was identified and removed with hot forceps and site(s) wasfulgurated.  The specimen(s) was retrieved and hemostasis was satisfactory.  The colonoscope was further withdrawn at this point and at 20 cm (sigmoid colon)  a less than 1 cm polyp(s) was identified and removed with hot snare and site(s) wasfulgurated.  The specimen(s) was retrieved and hemostasis was satisfactory.  The colonoscope was further withdrawn at this point and at 18 cm (sigmoid colon) three less than 1 cm polyp(s) were identified and removed with hot forceps and site(s) werefulgurated.  The specimen(s) were retrieved and hemostasis was satisfactory.  The colonoscope was further withdrawn at this point and within the rectum four less than 1 cm polyp(s) were identified and removed (3 with hot snare and 1 with hot forceps) and site(s) werefulgurated.  The specimen(s) were retrieved and hemostasis was satisfactory.  The colonoscope was then retroflexed and findings were notable for Grade 1 internal hemorrhoids.  At this point the colonoscopy was straightened and withdrawn and the procedure was complete.      Overall the patient tolerated the procedure without difficulty.    Keith Ingram will follow-up with me in 2 weeks to review pathology and further recommendations for  surveillance.      Young Souza MD
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
## 2018-01-19 NOTE — IP AVS SNAPSHOT
MRN:[id number]                      After Visit Summary   1/19/2018    Keith Ingram    MRN: [id number]           Thank you!     Thank you for choosing [facility] for your care. Our goal is always to provide you with excellent care. Hearing back from our patients is one way we can continue to improve our services. Please take a few minutes to complete the written survey that you may receive in the mail after you visit with us. Thank you!        Patient Information     Date Of Birth          1960        About your hospital stay     You were admitted on:  January 19, 2018 You last received care in the:  [facility] Preop/Phase II    You were discharged on:  January 19, 2018       Who to Call     For medical emergencies, please call 911.  For non-urgent questions about your medical care, please call your primary care provider or clinic, [phone]  For questions related to your surgery, please call your surgery clinic        Attending Provider     Provider Specialty    Young Souza MD [facility]       Primary Care Provider Office Phone # Fax #    Young Souza [phone]      After Care Instructions     Discharge Instructions       Resume pre procedure diet            Discharge Instructions       Restart home medications.                  Follow-up Appointments     Follow Up       Keith Ingram will follow-up with me in 2 weeks to review pathology and further recommendations for surveillance.                  Further instructions from your care team           INSTRUCTIONS AFTER COLONOSCOPY    WHEN YOU ARE BACK HOME:    Plan to rest for an hour or two after you get home.    You may have some cramping or pressure until you pass gas.    You may resume your regular medications.    Eat a small, light meal at first, and then gradually return to normal meal sizes.  If you had a polyp removed:    Slight bleeding may occur.  You may have a slight blood stain  on the toilet paper after a bowel movement.    To lessen the chance of bleeding, avoid heavy exercise for ONE WEEK.  This includes heavy lifting, vigorous sport activities, and heavy physical labor.  You may resume your normal sexual activity.      Avoid aspirin or aspirin products if instructed by your doctor.    WHAT TO WATCH FOR:  Problems rarely occur after the exam; however, it is important for you to watch for early signs of possible problems.  If you have     Unusual pain in your abdomen    Nausea and vomiting that persists    Excessive bleeding    Black or bloody bowel movements    Fever or temperature above 100.6 F  Please call your doctor ([facility] [phone]) or go to the nearest hospital emergency room.    Post-Anesthesia Patient Instructions    IMMEDIATELY FOLLOWING SURGERY:  Do not drive or operate machinery for the first twenty four hours after surgery.  Do not make any important decisions for twenty four hours after surgery or while taking narcotic pain medications or sedatives.  If you develop intractable nausea and vomiting or a severe headache please notify your doctor immediately.    FOLLOW-UP:  Please make an appointment with your surgeon as instructed. You do not need to follow up with anesthesia unless specifically instructed to do so.    WOUND CARE INSTRUCTIONS (if applicable):  Keep a dry clean dressing on the anesthesia/puncture wound site if there is drainage.  Once the wound has quit draining you may leave it open to air.  Generally you should leave the bandage intact for twenty four hours unless there is drainage.  If the epidural site drains for more than 36-48 hours please call the anesthesia department.    QUESTIONS?:  Please feel free to call your physician or the hospital  if you have any questions, and they will be happy to assist you.           FOLLOW UP Wednesday February 7TH @1:30pm .    Pending Results     No orders found from 1/17/2018 to 1/20/2018.  "           Admission Information     Date & Time Provider Department Dept. Phone    2018 Young Souza MD [facility] Preop/Phase [phone]      Your Vitals Were     Blood Pressure Temperature Respirations Height Weight Pulse Oximetry    139/85 97.2  F (36.2  C) (Oral) 18 1.778 m (5' 10\") 59.8 kg (131 lb 12.8 oz) 97%    BMI (Body Mass Index)                   18.91 kg/m2           MyChart Information     [facility] lets you send messages to your doctor, view your test results, renew your prescriptions, schedule appointments and more. To sign up, go to [email or web address] . Click on \"Log in\" on the left side of the screen, which will take you to the Welcome page. Then click on \"Sign up Now\" on the right side of the page.     You will be asked to enter the access code listed below, as well as some personal information. Please follow the directions to create your username and password.     Your access code is: [email or web address]  Expires: 2018  9:26 AM     Your access code will  in 90 days. If you need help or a new code, please call your [facility] clinic or [phone].        Care EveryWhere ID     This is your Care EveryWhere ID. This could be used by other organizations to access your [facility] medical records  [id number]        Equal Access to Services     [facility]: Hadii tr ellis Soleatha, waaxda luqadaha, qaybta kaalmada daljit, donis payton . So [facility] [phone].    ATENCIÓN: Si habla español, tiene a braga disposición servicios gratuitos de asistencia lingüística. Gurinder al [phone].    We comply with applicable federal civil rights laws and [place] laws. We do not discriminate on the basis of race, color, national origin, age, disability, sex, sexual orientation, or gender identity.               Review of your medicines      CONTINUE these medicines which have NOT CHANGED        Dose / Directions    * albuterol 108 (90 BASE) MCG/ACT Inhaler "   Commonly known as:  PROAIR HFA/PROVENTIL HFA/VENTOLIN HFA        Dose:  2 puff   Inhale 2 puffs into the lungs every 6 hours   Refills:  0       * albuterol (5 MG/ML) 0.5% neb solution   Commonly known as:  PROVENTIL        Dose:  2.5 mg   Take 2.5 mg by nebulization every 6 hours as needed for wheezing or shortness of breath / dyspnea   Refills:  0       ASPIRIN EC PO        Dose:  81 mg   Take 81 mg by mouth daily   Refills:  0       budesonide-formoterol 160-4.5 MCG/ACT Inhaler   Commonly known as:  SYMBICORT        Dose:  2 puff   Inhale 2 puffs into the lungs 2 times daily   Refills:  0       FLOMAX 0.4 MG capsule   Generic drug:  tamsulosin        Dose:  0.4 mg   Take 0.4 mg by mouth daily   Refills:  0       LIPITOR PO        Dose:  20 mg   Take 20 mg by mouth daily   Refills:  0       METOPROLOL TARTRATE PO        Dose:  25 mg   Take 25 mg by mouth 2 times daily   Refills:  0       roflumilast 500 MCG Tabs tablet   Commonly known as:  DALIRESP        Dose:  500 mcg   Take 500 mcg by mouth daily   Refills:  0       tiotropium 18 MCG capsule   Commonly known as:  SPIRIVA        Dose:  18 mcg   Inhale 18 mcg into the lungs daily   Refills:  0       UNABLE TO FIND        Oxygen at 2 l/m per n/c @@ HS daily   Refills:  0       * Notice:  This list has 2 medication(s) that are the same as other medications prescribed for you. Read the directions carefully, and ask your doctor or other care provider to review them with you.             Protect others around you: Learn how to safely use, store and throw away your medicines at www.disposemymeds.org.             Medication List: This is a list of all your medications and when to take them. Check marks below indicate your daily home schedule. Keep this list as a reference.      Medications           Morning Afternoon Evening Bedtime As Needed    * albuterol 108 (90 BASE) MCG/ACT Inhaler   Commonly known as:  PROAIR HFA/PROVENTIL HFA/VENTOLIN HFA   Inhale 2 puffs into  the lungs every 6 hours                                * albuterol (5 MG/ML) 0.5% neb solution   Commonly known as:  PROVENTIL   Take 2.5 mg by nebulization every 6 hours as needed for wheezing or shortness of breath / dyspnea                                ASPIRIN EC PO   Take 81 mg by mouth daily                                budesonide-formoterol 160-4.5 MCG/ACT Inhaler   Commonly known as:  SYMBICORT   Inhale 2 puffs into the lungs 2 times daily                                FLOMAX 0.4 MG capsule   Take 0.4 mg by mouth daily   Generic drug:  tamsulosin                                LIPITOR PO   Take 20 mg by mouth daily                                METOPROLOL TARTRATE PO   Take 25 mg by mouth 2 times daily                                roflumilast 500 MCG Tabs tablet   Commonly known as:  DALIRESP   Take 500 mcg by mouth daily                                tiotropium 18 MCG capsule   Commonly known as:  SPIRIVA   Inhale 18 mcg into the lungs daily                                UNABLE TO FIND   Oxygen at 2 l/m per n/c @@ HS daily                                * Notice:  This list has 2 medication(s) that are the same as other medications prescribed for you. Read the directions carefully, and ask your doctor or other care provider to review them with you.

## 2018-01-19 NOTE — OR NURSING
Patient and responsible adult given discharge instructions with no questions regarding instructions. Mundo score 20. Pain level 0/10.  Discharged from unit via w/c. Patient discharged to home.

## 2018-01-19 NOTE — IP AVS SNAPSHOT
[facility] Preop/Phase II    [address] [address]    Phone:  [phone]                                       After Visit Summary   1/19/2018    Keith Ingram    MRN: [id number]           After Visit Summary Signature Page     I have received my discharge instructions, and my questions have been answered. I have discussed any challenges I see with this plan with the nurse or doctor.    ..........................................................................................................................................  Patient/Patient Representative Signature      ..........................................................................................................................................  Patient Representative Print Name and Relationship to Patient    ..................................................               ................................................  Date                                            Time    ..........................................................................................................................................  Reviewed by Signature/Title    ...................................................              ..............................................  Date                                                            Time

## 2018-01-19 NOTE — DISCHARGE INSTRUCTIONS
INSTRUCTIONS AFTER COLONOSCOPY    WHEN YOU ARE BACK HOME:    Plan to rest for an hour or two after you get home.    You may have some cramping or pressure until you pass gas.    You may resume your regular medications.    Eat a small, light meal at first, and then gradually return to normal meal sizes.  If you had a polyp removed:    Slight bleeding may occur.  You may have a slight blood stain on the toilet paper after a bowel movement.    To lessen the chance of bleeding, avoid heavy exercise for ONE WEEK.  This includes heavy lifting, vigorous sport activities, and heavy physical labor.  You may resume your normal sexual activity.      Avoid aspirin or aspirin products if instructed by your doctor.    WHAT TO WATCH FOR:  Problems rarely occur after the exam; however, it is important for you to watch for early signs of possible problems.  If you have     Unusual pain in your abdomen    Nausea and vomiting that persists    Excessive bleeding    Black or bloody bowel movements    Fever or temperature above 100.6 F  Please call your doctor ([facility] [phone]) or go to the nearest hospital emergency room.    Post-Anesthesia Patient Instructions    IMMEDIATELY FOLLOWING SURGERY:  Do not drive or operate machinery for the first twenty four hours after surgery.  Do not make any important decisions for twenty four hours after surgery or while taking narcotic pain medications or sedatives.  If you develop intractable nausea and vomiting or a severe headache please notify your doctor immediately.    FOLLOW-UP:  Please make an appointment with your surgeon as instructed. You do not need to follow up with anesthesia unless specifically instructed to do so.    WOUND CARE INSTRUCTIONS (if applicable):  Keep a dry clean dressing on the anesthesia/puncture wound site if there is drainage.  Once the wound has quit draining you may leave it open to air.  Generally you should leave the bandage intact for twenty four  hours unless there is drainage.  If the epidural site drains for more than 36-48 hours please call the anesthesia department.    QUESTIONS?:  Please feel free to call your physician or the hospital  if you have any questions, and they will be happy to assist you.           FOLLOW UP Wednesday February 7TH @1:30pm .

## 2018-01-19 NOTE — OR NURSING
Took fluids and cookies w/o nausea.denies pain. States Dyspnea with dressing. Sao2 85.Up to 90 at rest

## 2018-01-19 NOTE — ANESTHESIA POSTPROCEDURE EVALUATION
Patient: Keith Ingram    Procedure(s):  COLONOSCOPY with Polypectomy and Resolution Clipping - Wound Class: II-Clean Contaminated    Diagnosis:SCREENING FOR COLORECTAL CANCER  Diagnosis Additional Information: No value filed.    Anesthesia Type:  MAC    Note:  Anesthesia Post Evaluation    Patient participation: Able to fully participate in evaluation  Level of consciousness: awake  Pain management: adequate  Airway patency: patent  Cardiovascular status: acceptable  Respiratory status: acceptable  Hydration status: acceptable  PONV: none     Anesthetic complications: None          Last vitals:  Vitals:    01/19/18 0945 01/19/18 0950 01/19/18 0957   BP: 136/76 136/78    Resp: 18 18    Temp:  97  F (36.1  C)    SpO2: 95% 95% 95%         Electronically Signed By: ELLIS Blas CRNA  January 19, 2018  10:53 AM

## 2018-01-19 NOTE — ANESTHESIA CARE TRANSFER NOTE
Patient: Keith Ingram    Procedure(s):  COLONOSCOPY with Polypectomy and Resolution Clipping - Wound Class: II-Clean Contaminated    Diagnosis: SCREENING FOR COLORECTAL CANCER  Diagnosis Additional Information: No value filed.    Anesthesia Type:   MAC     Note:  Airway :Nasal Cannula  Patient transferred to:Phase II  Handoff Report: Identifed the Patient, Identified the Reponsible Provider, Reviewed the pertinent medical history, Discussed the surgical course, Reviewed Intra-OP anesthesia mangement and issues during anesthesia, Set expectations for post-procedure period and Allowed opportunity for questions and acknowledgement of understanding      Vitals: (Last set prior to Anesthesia Care Transfer)    CRNA VITALS  1/19/2018 0844 - 1/19/2018 0914      1/19/2018             Pulse: 78    SpO2: 100 %                Electronically Signed By: ELLIS Lockwood CRNA  January 19, 2018  9:14 AM

## 2018-01-22 LAB — COPATH REPORT: NORMAL

## 2018-02-26 ENCOUNTER — HOSPITAL ENCOUNTER (EMERGENCY)
Facility: HOSPITAL | Age: 58
Discharge: HOME | End: 2018-02-26
Attending: PHYSICIAN ASSISTANT | Admitting: PHYSICIAN ASSISTANT
Payer: COMMERCIAL

## 2018-02-26 ENCOUNTER — APPOINTMENT (OUTPATIENT)
Dept: GENERAL RADIOLOGY | Facility: HOSPITAL | Age: 58
End: 2018-02-26
Attending: PHYSICIAN ASSISTANT
Payer: COMMERCIAL

## 2018-02-26 VITALS
OXYGEN SATURATION: 94 % | DIASTOLIC BLOOD PRESSURE: 90 MMHG | SYSTOLIC BLOOD PRESSURE: 142 MMHG | HEART RATE: 126 BPM | TEMPERATURE: 98.9 F | RESPIRATION RATE: 20 BRPM

## 2018-02-26 DIAGNOSIS — J44.1 ACUTE EXACERBATION OF CHRONIC OBSTRUCTIVE PULMONARY DISEASE (H): ICD-10-CM

## 2018-02-26 LAB
ALBUMIN SERPL-MCNC: 4.2 G/DL (ref 3.4–5)
ALP SERPL-CCNC: 77 U/L (ref 40–150)
ALT SERPL W P-5'-P-CCNC: 22 U/L (ref 0–70)
ANION GAP SERPL CALCULATED.3IONS-SCNC: 4 MMOL/L (ref 3–14)
AST SERPL W P-5'-P-CCNC: 18 U/L (ref 0–45)
BASOPHILS # BLD AUTO: 0 10E9/L (ref 0–0.2)
BASOPHILS NFR BLD AUTO: 0.6 %
BILIRUB SERPL-MCNC: 0.7 MG/DL (ref 0.2–1.3)
BUN SERPL-MCNC: 12 MG/DL (ref 7–30)
CALCIUM SERPL-MCNC: 9.6 MG/DL (ref 8.5–10.1)
CHLORIDE SERPL-SCNC: 97 MMOL/L (ref 94–109)
CO2 SERPL-SCNC: 33 MMOL/L (ref 20–32)
CREAT SERPL-MCNC: 0.9 MG/DL (ref 0.66–1.25)
DIFFERENTIAL METHOD BLD: ABNORMAL
EOSINOPHIL # BLD AUTO: 0 10E9/L (ref 0–0.7)
EOSINOPHIL NFR BLD AUTO: 0.1 %
ERYTHROCYTE [DISTWIDTH] IN BLOOD BY AUTOMATED COUNT: 12 % (ref 10–15)
GFR SERPL CREATININE-BSD FRML MDRD: 86 ML/MIN/1.7M2
GLUCOSE SERPL-MCNC: 118 MG/DL (ref 70–99)
HCT VFR BLD AUTO: 48 % (ref 40–53)
HGB BLD-MCNC: 16.2 G/DL (ref 13.3–17.7)
IMM GRANULOCYTES # BLD: 0 10E9/L (ref 0–0.4)
IMM GRANULOCYTES NFR BLD: 0.3 %
LYMPHOCYTES # BLD AUTO: 0.7 10E9/L (ref 0.8–5.3)
LYMPHOCYTES NFR BLD AUTO: 10.7 %
MCH RBC QN AUTO: 32.1 PG (ref 26.5–33)
MCHC RBC AUTO-ENTMCNC: 33.8 G/DL (ref 31.5–36.5)
MCV RBC AUTO: 95 FL (ref 78–100)
MONOCYTES # BLD AUTO: 0.8 10E9/L (ref 0–1.3)
MONOCYTES NFR BLD AUTO: 12.1 %
NEUTROPHILS # BLD AUTO: 5.2 10E9/L (ref 1.6–8.3)
NEUTROPHILS NFR BLD AUTO: 76.2 %
NRBC # BLD AUTO: 0 10*3/UL
NRBC BLD AUTO-RTO: 0 /100
PLATELET # BLD AUTO: 148 10E9/L (ref 150–450)
POTASSIUM SERPL-SCNC: 4.4 MMOL/L (ref 3.4–5.3)
PROT SERPL-MCNC: 8.1 G/DL (ref 6.8–8.8)
RBC # BLD AUTO: 5.04 10E12/L (ref 4.4–5.9)
SODIUM SERPL-SCNC: 134 MMOL/L (ref 133–144)
TROPONIN I SERPL-MCNC: <0.015 UG/L (ref 0–0.04)
WBC # BLD AUTO: 6.8 10E9/L (ref 4–11)

## 2018-02-26 PROCEDURE — 84484 ASSAY OF TROPONIN QUANT: CPT | Performed by: PHYSICIAN ASSISTANT

## 2018-02-26 PROCEDURE — 25000125 ZZHC RX 250: Performed by: PHYSICIAN ASSISTANT

## 2018-02-26 PROCEDURE — 99284 EMERGENCY DEPT VISIT MOD MDM: CPT | Performed by: PHYSICIAN ASSISTANT

## 2018-02-26 PROCEDURE — 94640 AIRWAY INHALATION TREATMENT: CPT

## 2018-02-26 PROCEDURE — 99284 EMERGENCY DEPT VISIT MOD MDM: CPT | Mod: 25

## 2018-02-26 PROCEDURE — 71046 X-RAY EXAM CHEST 2 VIEWS: CPT | Mod: TC

## 2018-02-26 PROCEDURE — 80053 COMPREHEN METABOLIC PANEL: CPT | Performed by: PHYSICIAN ASSISTANT

## 2018-02-26 PROCEDURE — 96374 THER/PROPH/DIAG INJ IV PUSH: CPT

## 2018-02-26 PROCEDURE — 25000128 H RX IP 250 OP 636: Performed by: PHYSICIAN ASSISTANT

## 2018-02-26 PROCEDURE — 36415 COLL VENOUS BLD VENIPUNCTURE: CPT | Performed by: PHYSICIAN ASSISTANT

## 2018-02-26 PROCEDURE — 85025 COMPLETE CBC W/AUTO DIFF WBC: CPT | Performed by: PHYSICIAN ASSISTANT

## 2018-02-26 RX ORDER — LEVOFLOXACIN 500 MG/1
500 TABLET, FILM COATED ORAL DAILY
Qty: 7 TABLET | Refills: 0 | Status: SHIPPED | OUTPATIENT
Start: 2018-02-26 | End: 2018-03-05

## 2018-02-26 RX ORDER — IPRATROPIUM BROMIDE AND ALBUTEROL SULFATE 2.5; .5 MG/3ML; MG/3ML
3 SOLUTION RESPIRATORY (INHALATION) ONCE
Status: COMPLETED | OUTPATIENT
Start: 2018-02-26 | End: 2018-02-26

## 2018-02-26 RX ORDER — BUSPIRONE HYDROCHLORIDE 7.5 MG/1
15 TABLET ORAL
COMMUNITY
Start: 2018-02-07 | End: 2023-06-23

## 2018-02-26 RX ORDER — IPRATROPIUM BROMIDE AND ALBUTEROL SULFATE 2.5; .5 MG/3ML; MG/3ML
3 SOLUTION RESPIRATORY (INHALATION)
COMMUNITY
Start: 2017-12-28 | End: 2023-06-23

## 2018-02-26 RX ORDER — METHYLPREDNISOLONE SODIUM SUCCINATE 125 MG/2ML
125 INJECTION, POWDER, LYOPHILIZED, FOR SOLUTION INTRAMUSCULAR; INTRAVENOUS ONCE
Status: COMPLETED | OUTPATIENT
Start: 2018-02-26 | End: 2018-02-26

## 2018-02-26 RX ORDER — BUPROPION HYDROCHLORIDE 150 MG/1
TABLET, FILM COATED, EXTENDED RELEASE ORAL
COMMUNITY
Start: 2018-01-08 | End: 2023-06-23

## 2018-02-26 RX ORDER — PREDNISONE 50 MG/1
50 TABLET ORAL DAILY
Qty: 5 TABLET | Refills: 0 | Status: SHIPPED | OUTPATIENT
Start: 2018-02-26 | End: 2018-03-03

## 2018-02-26 RX ORDER — NICOTINE 21 MG/24HR
1 PATCH, TRANSDERMAL 24 HOURS TRANSDERMAL
COMMUNITY
Start: 2018-02-07

## 2018-02-26 RX ADMIN — IPRATROPIUM BROMIDE AND ALBUTEROL SULFATE 3 ML: .5; 3 SOLUTION RESPIRATORY (INHALATION) at 11:02

## 2018-02-26 RX ADMIN — METHYLPREDNISOLONE SODIUM SUCCINATE 125 MG: 125 INJECTION, POWDER, FOR SOLUTION INTRAMUSCULAR; INTRAVENOUS at 11:14

## 2018-02-26 ASSESSMENT — ENCOUNTER SYMPTOMS
FEVER: 0
LIGHT-HEADEDNESS: 0
WEAKNESS: 0
CHEST TIGHTNESS: 1
COUGH: 1
PALPITATIONS: 0
SHORTNESS OF BREATH: 1
NAUSEA: 0
ABDOMINAL PAIN: 0
DIZZINESS: 0
WHEEZING: 0
DIARRHEA: 0
CHILLS: 0
VOMITING: 0

## 2018-02-26 NOTE — DISCHARGE INSTRUCTIONS
We are going to treat with prednisone and Levaquin.     Take the first Levaquin when you  the prescription.  You can start the prednisone at supper.     Rest and use the oxygen at home to keep your saturation above 90%.    Please follow-up with Dr. Souza this week for recheck.     Please return here for any other concerns, worsening of ANY time, or any other concerns.

## 2018-02-26 NOTE — ED AVS SNAPSHOT
[facility]    [address] [address]    Phone:  [phone]                                       Keith Ingram   MRN: [id number]    Department:  [facility]   Date of Visit:  2/26/2018           After Visit Summary Signature Page     I have received my discharge instructions, and my questions have been answered. I have discussed any challenges I see with this plan with the nurse or doctor.    ..........................................................................................................................................  Patient/Patient Representative Signature      ..........................................................................................................................................  Patient Representative Print Name and Relationship to Patient    ..................................................               ................................................  Date                                            Time    ..........................................................................................................................................  Reviewed by Signature/Title    ...................................................              ..............................................  Date                                                            Time

## 2018-02-26 NOTE — ED AVS SNAPSHOT
[facility]    [address] [address]    Phone:  [phone]                                       Keith Ingram   MRN: [id number]    Department:  [facility]   Date of Visit:  2/26/2018           Patient Information     Date Of Birth          1960        Your diagnoses for this visit were:     Acute exacerbation of chronic obstructive pulmonary disease (H)        You were seen by Yue Lucas PA-C.      Follow-up Information     Schedule an appointment as soon as possible for a visit with Young Souza MD.    Specialty:  Family Practice    Contact information:    [facility]  [address] [place] [address] [phone]          Follow up with [facility].    Specialty:  EMERGENCY MEDICINE    Why:  If symptoms worsen    Contact information:    [address] [address] [phone]    Additional information:    From [place]: Take [address]. Turn left at [address]. Turn left at the first stoplight on [address]. At the first stop sign, take a right onto [address]. Take a left into the parking lot and continue through until you reach the North enterance of the building.       From [place]: Take [address]. Take the [address] ramp towards [place]. Turn left onto [address]. Take a slight right onto [address]. Turn left at the first stoplight on [address]. At the first stop sign, take a right onto [address]. Take a left into the parking lot and continue through until you reach the North enterance of the building.       From [place]: Take [address]. Take a right at [address]. At the first stop sign, take a right onto [address]. Take a left into the parking lot and continue through until you reach the North enterance of the building.         Discharge Instructions       We are going to treat with prednisone  and Levaquin.     Take the first Levaquin when you  the prescription.  You can start the prednisone at supper.     Rest and use the oxygen at home to keep your saturation above 90%.    Please follow-up with Dr. Souza this week for recheck.     Please return here for any other concerns, worsening of ANY time, or any other concerns.      Discharge References/Attachments     COPD FLARE (ENGLISH)         Review of your medicines      START taking        Dose / Directions Last dose taken    levofloxacin 500 MG tablet   Commonly known as:  LEVAQUIN   Dose:  500 mg   Quantity:  7 tablet        Take 1 tablet (500 mg) by mouth daily for 7 days   Refills:  0        predniSONE 50 MG tablet   Commonly known as:  DELTASONE   Dose:  50 mg   Quantity:  5 tablet        Take 1 tablet (50 mg) by mouth daily for 5 days   Refills:  0          Our records show that you are taking the medicines listed below. If these are incorrect, please call your family doctor or clinic.        Dose / Directions Last dose taken    * albuterol 108 (90 BASE) MCG/ACT Inhaler   Commonly known as:  PROAIR HFA/PROVENTIL HFA/VENTOLIN HFA   Dose:  2 puff        Inhale 2 puffs into the lungs every 6 hours   Refills:  0        * albuterol (5 MG/ML) 0.5% neb solution   Commonly known as:  PROVENTIL   Dose:  2.5 mg        Take 2.5 mg by nebulization every 6 hours as needed for wheezing or shortness of breath / dyspnea   Refills:  0        ASPIRIN EC PO   Dose:  81 mg        Take 81 mg by mouth daily   Refills:  0        budesonide-formoterol 160-4.5 MCG/ACT Inhaler   Commonly known as:  SYMBICORT   Dose:  2 puff        Inhale 2 puffs into the lungs 2 times daily   Refills:  0        buPROPion 150 MG 12 hr tablet   Commonly known as:  ZYBAN        Take 1 tablet  by mouth every morning for 3 days, 1 tablet by mouth twice daily for 7-12 weeks. Begin 1-2 weeks before quitting smoking.   Refills:  0        busPIRone 7.5 MG tablet   Commonly known as:  BUSPAR    Dose:  15 mg        Take 15 mg by mouth   Refills:  0        FLOMAX 0.4 MG capsule   Dose:  0.4 mg   Generic drug:  tamsulosin        Take 0.4 mg by mouth daily   Refills:  0        ipratropium - albuterol 0.5 mg/2.5 mg/3 mL 0.5-2.5 (3) MG/3ML neb solution   Commonly known as:  DUONEB   Dose:  3 mL        Inhale 3 mLs into the lungs   Refills:  0        LIPITOR PO   Dose:  20 mg        Take 20 mg by mouth daily   Refills:  0        METOPROLOL TARTRATE PO   Dose:  25 mg        Take 25 mg by mouth 2 times daily   Refills:  0        nicotine 14 MG/24HR 24 hr patch   Commonly known as:  NICODERM CQ   Dose:  1 patch        1 patch   Refills:  0        nicotine polacrilex 2 MG gum   Commonly known as:  NICORETTE        Chew 1-2 pieces per hour or 10-12 pieces per day, taper as tolerated   Refills:  0        roflumilast 500 MCG Tabs tablet   Commonly known as:  DALIRESP   Dose:  500 mcg        Take 500 mcg by mouth daily   Refills:  0        tiotropium 18 MCG capsule   Commonly known as:  SPIRIVA   Dose:  18 mcg        Inhale 18 mcg into the lungs daily   Refills:  0        UNABLE TO FIND        Oxygen at 2 l/m per n/c @@ HS daily   Refills:  0        * Notice:  This list has 2 medication(s) that are the same as other medications prescribed for you. Read the directions carefully, and ask your doctor or other care provider to review them with you.            Prescriptions were sent or printed at these locations (2 Prescriptions)                   [id number] - [place]  [address] [address] [address] [place] [address]    Telephone:  [phone]   Fax:  [phone]   Hours:                  E-Prescribed (2 of 2)         levofloxacin (LEVAQUIN) 500 MG tablet               predniSONE (DELTASONE) 50 MG tablet                Procedures and tests performed during your visit     CBC with platelets differential    Comprehensive metabolic panel    Peripheral IV catheter    Troponin I    XR Chest 2 Views     "  Orders Needing Specimen Collection     None      Pending Results     No orders found from 2018 to 2018.            Pending Culture Results     No orders found from 2018 to 2018.            Thank you for choosing [facility]       Thank you for choosing [facility] for your care. Our goal is always to provide you with excellent care. Hearing back from our patients is one way we can continue to improve our services. Please take a few minutes to complete the written survey that you may receive in the mail after you visit with us. Thank you!        [facility] Information     [facility] lets you send messages to your doctor, view your test results, renew your prescriptions, schedule appointments and more. To sign up, go to [email or web address] . Click on \"Log in\" on the left side of the screen, which will take you to the Welcome page. Then click on \"Sign up Now\" on the right side of the page.     You will be asked to enter the access code listed below, as well as some personal information. Please follow the directions to create your username and password.     Your access code is: [email or web address]  Expires: 2018  9:26 AM     Your access code will  in 90 days. If you need help or a new code, please call your [facility] clinic or [phone].        Care EveryWhere ID     This is your Care EveryWhere ID. This could be used by other organizations to access your [facility] medical records  [id number]        Equal Access to Services     LEATHA RHODES AH: Hadii tr shieldso Soleatha, waaxda luqadaha, qaybta kaalmada adeegyada, donis payton . So [facility] [phone].    ATENCIÓN: Si habla español, tiene a braga disposición servicios gratuitos de asistencia lingüística. Llame al [phone].    We comply with applicable federal civil rights laws and [place] laws. We do not discriminate on the basis of race, color, national origin, age, disability, sex, sexual orientation, or gender " identity.            After Visit Summary       This is your record. Keep this with you and show to your community pharmacist(s) and doctor(s) at your next visit.

## 2018-02-26 NOTE — ED PROVIDER NOTES
History     Chief Complaint   Patient presents with     Shortness of Breath     The history is provided by the patient.     Keith Ingram is a 57 year old male who presents to the emergency department via wheelchair and along with wife for evaluation of shortness of breath and cough.  Neck reports that the symptoms began Saturday morning.  He has significant nearly end-stage COPD.  He has home oxygen as needed.  Has had some chills, but no overt fevers.  Significant shortness of breath along with chest tightness and fullness.  No diaphoresis.  He no longer smokes.    Problem List:    There are no active problems to display for this patient.       Past Medical History:    Past Medical History:   Diagnosis Date     COPD (chronic obstructive pulmonary disease) (H)      ETOH abuse      Heart murmur      Hypertension      Sleep apnea        Past Surgical History:    Past Surgical History:   Procedure Laterality Date     COLONOSCOPY N/A 1/19/2018    Procedure: COLONOSCOPY;  COLONOSCOPY with Polypectomy and Resolution Clipping;  Surgeon: Young Souza MD;  Location: [facility]     COLONOSCOPY - HIM SCAN  09/06/2007    Colonoscopy, lou [facility]DS[facility]       Family History:    No family history on file.    Social History:  Marital Status:   [2]  Social History   Substance Use Topics     Smoking status: Current Some Day Smoker     Smokeless tobacco: Never Used     Alcohol use No        Medications:      busPIRone (BUSPAR) 7.5 MG tablet   buPROPion (ZYBAN) 150 MG 12 hr tablet   ipratropium - albuterol 0.5 mg/2.5 mg/3 mL (DUONEB) 0.5-2.5 (3) MG/3ML neb solution   nicotine polacrilex (NICORETTE) 2 MG gum   nicotine (NICODERM CQ) 14 MG/24HR 24 hr patch   levofloxacin (LEVAQUIN) 500 MG tablet   predniSONE (DELTASONE) 50 MG tablet   UNABLE TO FIND   tamsulosin (FLOMAX) 0.4 MG capsule   Atorvastatin Calcium (LIPITOR PO)   METOPROLOL TARTRATE PO   albuterol (PROAIR HFA/PROVENTIL  HFA/VENTOLIN HFA) 108 (90 BASE) MCG/ACT Inhaler   budesonide-formoterol (SYMBICORT) 160-4.5 MCG/ACT Inhaler   albuterol (PROVENTIL) (5 MG/ML) 0.5% neb solution   roflumilast (DALIRESP) 500 MCG TABS tablet   tiotropium (SPIRIVA) 18 MCG capsule   ASPIRIN EC PO         Review of Systems   Constitutional: Negative for chills and fever.   Respiratory: Positive for cough, chest tightness and shortness of breath. Negative for wheezing.    Cardiovascular: Negative for chest pain and palpitations.   Gastrointestinal: Negative for abdominal pain, diarrhea, nausea and vomiting.   Genitourinary: Negative.    Skin: Negative.    Neurological: Negative for dizziness, weakness and light-headedness.       Physical Exam   BP: 147/92  Heart Rate: 120  SpO2: (!) 88 % (increased to 2 LPM and O2 now 94%.)      Physical Exam   Constitutional: He is oriented to person, place, and time. He appears well-developed and well-nourished.   thin and chronically ill-appearing.   Cardiovascular: Regular rhythm.    Tachycardia   Pulmonary/Chest: He is in respiratory distress. He has wheezes.   He has some increased work of breathing.  There is some minimal distress.  However he talks in normal sentences.  Lung sounds are distant with mixed expiratory wheezes and rhonchi.   Neurological: He is alert and oriented to person, place, and time.   Skin: Skin is warm and dry.   Psychiatric: He has a normal mood and affect.   Nursing note and vitals reviewed.      ED Course     ED Course     Procedures          Medications   methylPREDNISolone sodium succinate (solu-MEDROL) injection 125 mg (125 mg Intravenous Given 2/26/18 1114)   ipratropium - albuterol 0.5 mg/2.5 mg/3 mL (DUONEB) neb solution 3 mL (3 mLs Nebulization Given 2/26/18 1102)     Results for orders placed or performed during the hospital encounter of 02/26/18   XR Chest 2 Views    Narrative    PROCEDURE: XR CHEST 2 VW 2/26/2018 11:27 AM    HISTORY: dyspnea;     COMPARISONS: None.    TECHNIQUE: 2  views.    FINDINGS: Heart and pulmonary vasculature are normal. Lungs are  hyperinflated with flattening of the diaphragm. No acute infiltrate or  effusion is seen.         Impression    IMPRESSION: COPD without acute disease.    MIKO CRUZ MD   CBC with platelets differential   Result Value Ref Range    WBC 6.8 4.0 - 11.0 10e9/L    RBC Count 5.04 4.4 - 5.9 10e12/L    Hemoglobin 16.2 13.3 - 17.7 g/dL    Hematocrit 48.0 40.0 - 53.0 %    MCV 95 78 - 100 fl    MCH 32.1 26.5 - 33.0 pg    MCHC 33.8 31.5 - 36.5 g/dL    RDW 12.0 10.0 - 15.0 %    Platelet Count 148 (L) 150 - 450 10e9/L    Diff Method Automated Method     % Neutrophils 76.2 %    % Lymphocytes 10.7 %    % Monocytes 12.1 %    % Eosinophils 0.1 %    % Basophils 0.6 %    % Immature Granulocytes 0.3 %    Nucleated RBCs 0 0 /100    Absolute Neutrophil 5.2 1.6 - 8.3 10e9/L    Absolute Lymphocytes 0.7 (L) 0.8 - 5.3 10e9/L    Absolute Monocytes 0.8 0.0 - 1.3 10e9/L    Absolute Eosinophils 0.0 0.0 - 0.7 10e9/L    Absolute Basophils 0.0 0.0 - 0.2 10e9/L    Abs Immature Granulocytes 0.0 0 - 0.4 10e9/L    Absolute Nucleated RBC 0.0    Comprehensive metabolic panel   Result Value Ref Range    Sodium 134 133 - 144 mmol/L    Potassium 4.4 3.4 - 5.3 mmol/L    Chloride 97 94 - 109 mmol/L    Carbon Dioxide 33 (H) 20 - 32 mmol/L    Anion Gap 4 3 - 14 mmol/L    Glucose 118 (H) 70 - 99 mg/dL    Urea Nitrogen 12 7 - 30 mg/dL    Creatinine 0.90 0.66 - 1.25 mg/dL    GFR Estimate 86 >60 mL/min/1.7m2    GFR Estimate If Black >90 >60 mL/min/1.7m2    Calcium 9.6 8.5 - 10.1 mg/dL    Bilirubin Total 0.7 0.2 - 1.3 mg/dL    Albumin 4.2 3.4 - 5.0 g/dL    Protein Total 8.1 6.8 - 8.8 g/dL    Alkaline Phosphatase 77 40 - 150 U/L    ALT 22 0 - 70 U/L    AST 18 0 - 45 U/L   Troponin I   Result Value Ref Range    Troponin I ES <0.015 0.000 - 0.045 ug/L        Critical Care time:  none               Labs Ordered and Resulted from Time of ED Arrival Up to the Time of Departure from the ED    CBC WITH PLATELETS DIFFERENTIAL - Abnormal; Notable for the following:        Result Value    Platelet Count 148 (*)     Absolute Lymphocytes 0.7 (*)     All other components within normal limits   COMPREHENSIVE METABOLIC PANEL - Abnormal; Notable for the following:     Carbon Dioxide 33 (*)     Glucose 118 (*)     All other components within normal limits   TROPONIN I   PERIPHERAL IV CATHETER       Assessments & Plan (with Medical Decision Making)   Treated with IV Solu-Medrol and nebulizer.  Neck was observed for more than 2 hours and did well.  He was found sitting upright in bed talkative and pleasant on numerous rechecks.  I do long detailed discussion regarding observation versus discharge.  He has home oxygen as well as home nebulizers.  He would like to go home.  This is certainly reasonable.  Risks of hospitalization likely outweigh any perceived benefit.  Home with Levaquin and prednisone.  Oxygen to keep her sats above 90%.  Regular nebulizer therapy every 3-4 hours.  I stressed the importance of returning to the emergency department for any worsening whatsoever.  Neck voiced complete understanding and was agreeable.    Tachycardia is not new.  Last COPD exacerbation in there clinic showed a HR of 115.  He received Augmentin in December.       I have reviewed the nursing notes.    I have reviewed the findings, diagnosis, plan and need for follow up with the patient.       New Prescriptions    LEVOFLOXACIN (LEVAQUIN) 500 MG TABLET    Take 1 tablet (500 mg) by mouth daily for 7 days    PREDNISONE (DELTASONE) 50 MG TABLET    Take 1 tablet (50 mg) by mouth daily for 5 days       Final diagnoses:   Acute exacerbation of chronic obstructive pulmonary disease (H)       2/26/2018   [facility]     Yue Lucas PA-C  02/26/18 1225       Yue Lucas PA-C  02/26/18 1251

## 2018-04-17 ENCOUNTER — APPOINTMENT (OUTPATIENT)
Dept: ANESTHESIOLOGY | Facility: HOSPITAL | Age: 58
End: 2018-04-17
Attending: OPHTHALMOLOGY
Payer: COMMERCIAL

## 2018-04-17 PROCEDURE — 00142 ANES PX ON EYE LENS SURGERY: CPT | Mod: QZ | Performed by: NURSE ANESTHETIST, CERTIFIED REGISTERED

## 2018-05-01 ENCOUNTER — APPOINTMENT (OUTPATIENT)
Dept: ANESTHESIOLOGY | Facility: HOSPITAL | Age: 58
End: 2018-05-01
Attending: OPHTHALMOLOGY
Payer: COMMERCIAL

## 2018-05-01 PROCEDURE — 00142 ANES PX ON EYE LENS SURGERY: CPT | Mod: QZ | Performed by: NURSE ANESTHETIST, CERTIFIED REGISTERED

## 2022-02-24 ENCOUNTER — HOSPITAL ENCOUNTER (OUTPATIENT)
Dept: CARDIAC REHAB | Facility: HOSPITAL | Age: 62
Setting detail: THERAPIES SERIES
End: 2022-02-24
Attending: INTERNAL MEDICINE
Payer: MEDICARE

## 2022-02-24 VITALS — WEIGHT: 140 LBS | HEIGHT: 70 IN | BODY MASS INDEX: 20.04 KG/M2

## 2022-02-24 PROCEDURE — 999N000108 HC STATISTIC OP CARDIAC VISIT #2

## 2022-02-24 PROCEDURE — 93798 PHYS/QHP OP CAR RHAB W/ECG: CPT

## 2022-02-24 PROCEDURE — 93797 PHYS/QHP OP CAR RHAB WO ECG: CPT | Mod: 59

## 2022-02-24 PROCEDURE — 999N000109 HC STATISTIC OP CR VISIT

## 2022-02-24 ASSESSMENT — 6 MINUTE WALK TEST (6MWT)
TOTAL DISTANCE WALKED (FT): 220
MALE CALC: 1996.49
GENDER SELECTION: MALE
PREDICTED: 2008.66
FEMALE CALC: 1745.91

## 2022-02-24 NOTE — PROGRESS NOTES
02/24/22 0900   Session   Session Initial Evaluation and Exercise Prescription   Certified through this date 03/25/22   Cardiac Rehab Assessment   Cardiac Rehab Assessment 2/24: Pt arrives to phase II rehab S/P MVR. Cardiology has been monitoring mitral regurgitation for awhile. Pt's shortness of breath started to become untolerable and VMR was scheduled. Pt feels they are healing well, but continues to have compaints of shortness of breath. Hx includes severe COPD, anxiety, HTN, and JAYASHREE. Pt is willing to try phase II rehab for awhile. Staff explained the benefits of the program. Pt will attend Tuesdaya and Thursdays at 9am starting March 3.      The patient's history and clinical status including hemodynamics and ECG were evaluated. The patient was assessed to be stable and appropriate to begin exercise.   The patient's functional capacity and exercise prescription were determined by the completion of the 6 minute walk test. See results above. The patient was oriented to the program.  Risk factor profile was completed. Goals and objectives were discussed. CV response was WNL. No symptoms, complaints or pain were reported. Good prognosis for reaching goals below. Skilled therapy is necessary in order to monitor CV response to exercise, to provide education on risk factors and behavior change counseling needed to achieve patient's goals.  Plan to progress to 30-40 minutes of exercise prior to discharge from cardiac rehab.  Initial THR of 20-30 beats above RHR; Effort rating of 4-6. Initiate muscle conditioning as appropriate. Provide risk factor education and behavior change counseling.    General Information   Treatment Diagnosis Valve Replacement   Date of Treatment Diagnosis 01/19/22   Comorbidities Pulmonary Disease   Other Medical History Severe COPD, JAYASHREE, HTN, anxiety   Lead up symptoms 2/24: Cardiology has been monitoring mitral regurgitation for awhile. Pt's shortness of breath started to become  untolerable and VMR was scheduled.   Hospital Location [facility] Discharge Date 01/27/22   Signs and Symptoms Post Hospital Discharge SOB;Dizziness   Comments 2/24: Pt feels they are healing well, but continues to have compaints of shortness of breath.    Outpatient Cardiac Rehab Start Date 02/24/22   Primary Physician Dr. Varghese   Primary Physician Follow Up Completed  (3/8/2022)   Specialist Dr. Gonzalez   Specialist Follow Up Scheduled  (3/1/2022)   Cardiologist Dr. 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 No   Have you fallen and had an injury in the past year? No   Is patient a fall risk? No   Fall screen comments 2/24: Pt has not had any recent falls. Staff will continue to monitor and assess throout phase II rehab.   Abuse Screen (yes response referral indicated)   Feels Unsafe at Home or Work/School no   Feels Threatened by Someone no   Does Anyone Try to Keep You From Having Contact with Others or Doing Things Outside Your Home? no   Physical Signs of Abuse Present no   Pain   Patient Currently in Pain No   Physical Assessments   Incisions WNL   Edema None   Right Lung Sounds not assessed   Left Lung Sounds not assessed   Limitations Surgical   Comments 2/24: Sternal precautions of 10lbs until 3/16.   Individualized Treatment Plan   Monitored Sessions Scheduled 25   Monitored Sessions Attended 1   Oxygen   Supplemental Oxygen needed Yes   Oxygen liters at rest 0   Oxygen liters with activity 2L   Oxygen Usage Comment 2/24: Pt has been  "trying to wean himself off oxygen usage, so he has not been using O2 at rest. Oxygen is used when walking longer distance at 2L with portable pulse concentrator. Pt does have a large concentrator at home.,   Nutrition Management - Weight Management   Assessment Initial Assessment   Age 61   Weight 63.5 kg (140 lb)   Height 1.778 m (5' 10\")   BMI (Calculated) 20.09   Initial Rate Your Plate Score. Dietary tool to assess eating patterns. Scores range from 24 to 72. The higher the score the healthier the eating pattern. 44   Weight Management Comments 2/24: Pt is not looking to lose weight as he has a loer BMI, but eats w well rouned diet.   Nutrition Management - Lipids   Lipids Labs Not Available   Prescribed Lipid Medication Yes   Statin Intensity Moderate Intensity   Lipid Comments 2/24: Pt states he is compliant with prescribed medication.   Nutrition Management - Diabetes   Diabetes No   Nutrition Management Summary   Dietary Recommendations Low Sodium   Stages of Change for Diet Compliance Preparation   Interventions Planned Attend Nutrition Education Class(es)   Nutrition Summary Comments 2/24: Pt eats a well rounded diet and staff will provide nutritional education.   Nutrition Target Outcome Total Chol < 150, HDL > 40 (M), HDL > 50 (W), LDL < 70, Trig < 150   Psychosocial Management   Psychosocial Assessment Initial   Is there history of clinical depression or increased risk of depression? No previous history   Current Level of Stress per Patient Report Mild   Current Coping Skills Has Positive Support System   Initial Patient Health Questionnaire -9 Score (PHQ-9) for depression. 5-9 Minimal symptoms, 10-14 Minor depression, 15-19 Major depression, moderately severe, > 20 Major depression, severe  5   Initial [facility] Survey score.  Quality of Life:   If total score > 25 review individual areas where patient rated a 4 or 5.  Consider patients current medical condition and what role that plays on the " score.   Adjust treatment protocol to improve areas of concern.  Consider the following:  PHQ9 score, DASI, and re-assessment within the next 30 days to assist with developing treatments.  25   Stages of Change Preparation   Patient Goal No   Psychosocial Comments 2/24: Pt is prescribed ativan for anxiety. Pt states he has a great support system and is able to communicate any problems or issues with them.   Psychosocial Target Outcome Maximize coping skills   Other Core Components - Hypertension   History of or Diagnosis of Hypertension Yes   Currently taking Anti-Hypertensives Yes;Beta blocker   Hypertension Comments 2/24: Pt is compliant with prescribed medictions.   Other Core Components - Tobacco   History of Tobacco Use Yes   Quit Date or Planned Quit Date 01/01/19  (When he quit smoking)   Tobacco Use Status Former (Quit > 6 mo ago);Currently smoking - everyday   Tobacco Habit Cigarettes;Chew   Tobacco Use per Day (average) 1-2ppd; <1 can daily   Years of Tobacco Use +40   Stages of Change Pre-Contemplation   Tobacco Comments 2/24: Pt quit smpking in 2019 by replacing it with chewing. He has not had the urge to smoke since, but chew < 1 can per day.   Other Core Components Summary   Interventions Planned Provide information on home blood pressure monitoring;Educate on importance of monitoring daily weight   Patient Goals No   Other Core Components Comments 2/24: Pt denies the need for further core component intervention at this time.   Other Core Components Target Outcome Compliance with Diet, Medications and Symptom Management to allow for stable Heart Failure   Activity/Exercise History   Activity/Exercise Assessment Initial   Activity/Exercise Status prior to event? Was Physically Active   Current Stage of Change (Physical Activity) Preparation   Current Stage of Change (Aerobic Exercise) Preparation   Activity/Exercise Comments 2/24: Pt tries to remain physically active as able due to severe COPD.    Activity/Exercise Target Outcome An Accumulation of 150  Minutes of Aerobic Activity per Week   Exercise Assessment   6 Minute Walk Predicted - Gender Selection Male   6 Minute Walk Predicted (Male) 1996.49   6 Minute Walk Predicted (Female) 1745.91   Initial 6 Minute Walk Distance (Feet) 220 ft   Resting HR 77 bpm   Exercise HR 99 bpm   Post Exercise HR 80 bpm   Resting /70   Exercise /76   Post Exercise /76   Pre SpO2 92   While Exercising SpO2 94   Post SpO2 94   Effort Rating 7   Current MET Level 1.2   MET Level Goal 3-4   ECG Rhythm Sinus rhythm   Ectopy PACs   Current Symptoms Dyspnea   Limitations/Restrictions Sternal Precautions   Exercise Prescription   Mode Treadmill;Nustep;Recumbant bike   Duration/Time 15-30 min;Intermittent bouts   Frequency 2 days/week   THR (85% of age predicted max HR) 133.45   OMNI Effort Rating (0-10 Scale) 4-6/10   Progression Continuous bouts;Total exercise time of 20-30 minutes   Comments 2/4: Pt eill be introduced to cardiac gym and equipment at next session on 3/3 attending every Tuesday and Thursday at 9am.   Recommended Home Exercise   Type of Exercise Walking   Frequency (days per week) 2-3   Duration (minutes per session) Intermittent   Effort Rating Recommended 4-6/10   30 Day Exercise Plan 2/24: Ptis encouraged to walk intermittently throughout the day as tolerated.   Current Home Exercise   Type of Exercise Walking   Follow-up/On-going Support   Provider follow-up needed on the following No follow-up needed   Learning Assessment   Learner Patient   Primary Language English   Preferred Learning Style Listening;Reading;Demonstration   Barriers to Learning No barriers noted   Patient Education   Title of Program Cardiac Rehab Education   Education recommended Anatomy and Physiology of the Heart;Blood Pressure;Exercise Principles;Heart Failure;Diabetes;Medication Overview;Nutrition;Risk Factors;Stress Management   Education Comments 2/24: Pt will be  provided education throughout phase II rehab.

## 2022-03-15 ENCOUNTER — HOSPITAL ENCOUNTER (OUTPATIENT)
Dept: CARDIAC REHAB | Facility: HOSPITAL | Age: 62
Setting detail: THERAPIES SERIES
Discharge: HOME | End: 2022-03-15
Attending: FAMILY MEDICINE
Payer: MEDICARE

## 2022-03-15 PROCEDURE — 93798 PHYS/QHP OP CAR RHAB W/ECG: CPT

## 2022-03-15 PROCEDURE — 999N000109 HC STATISTIC OP CR VISIT

## 2022-03-17 ENCOUNTER — HOSPITAL ENCOUNTER (OUTPATIENT)
Dept: CARDIAC REHAB | Facility: HOSPITAL | Age: 62
Setting detail: THERAPIES SERIES
Discharge: HOME | End: 2022-03-17
Attending: FAMILY MEDICINE
Payer: MEDICARE

## 2022-03-17 PROCEDURE — 999N000109 HC STATISTIC OP CR VISIT

## 2022-03-17 PROCEDURE — 93798 PHYS/QHP OP CAR RHAB W/ECG: CPT

## 2022-03-22 ENCOUNTER — HOSPITAL ENCOUNTER (OUTPATIENT)
Dept: CARDIAC REHAB | Facility: HOSPITAL | Age: 62
Setting detail: THERAPIES SERIES
Discharge: HOME | End: 2022-03-22
Attending: THORACIC SURGERY (CARDIOTHORACIC VASCULAR SURGERY)
Payer: MEDICARE

## 2022-03-22 VITALS — HEIGHT: 70 IN | BODY MASS INDEX: 19.47 KG/M2 | WEIGHT: 136 LBS

## 2022-03-22 PROCEDURE — 999N000109 HC STATISTIC OP CR VISIT

## 2022-03-22 PROCEDURE — 93798 PHYS/QHP OP CAR RHAB W/ECG: CPT

## 2022-03-22 ASSESSMENT — 6 MINUTE WALK TEST (6MWT)
TOTAL DISTANCE WALKED (FT): 220
MALE CALC: 2006.99
FEMALE CALC: 1759.57
PREDICTED: 2019.22
GENDER SELECTION: MALE

## 2022-03-22 NOTE — PROGRESS NOTES
03/22/22 1100   Session   Session 30 Day Individualized Treatment Plan   Certified through this date 04/20/22   Cardiac Rehab Assessment   Cardiac Rehab Assessment 3/22: Pt completes 4 sessions of phase II rehab. Overall, pt is progressing slowly, but continues to work hard. Highest MET level achieved is 2.2 MET with appropriate hemodynamic response. Pt used 1.5L of O2 during last session with complaints of shortness of breath. Time was increased on seated elliptical and treadmill. Staff keeps in mind SOB scale while pt exercises due to severe COPD. Nutrition education was declined this week due to having enough information from [facility] during event discharge. Skilled services are necessary to monitor CV response and educate on personal risk factors.   General Information   Treatment Diagnosis Valve Replacement   Date of Treatment Diagnosis 01/19/22   Comorbidities Pulmonary Disease   Other Medical History Severe COPD, JAYASHREE, HTN, anxiety   Lead up symptoms 2/24: Cardiology has been monitoring mitral regurgitation for awhile. Pt's shortness of breath started to become untolerable and VMR was scheduled.   Hospital Location [facility] Discharge Date 01/27/22   Signs and Symptoms Post Hospital Discharge SOB;Dizziness   Comments 3/22: Pt has been using 1.5-2L O2 during phase II rehab and continues to have complaints of chortness of breath.   Outpatient Cardiac Rehab Start Date 02/24/22   Primary Physician Dr. Varghese   Primary Physician Follow Up Completed  (3/8/2022)   Specialist Dr. Gonzalez   Specialist Follow Up Completed  (3/1/2022)   Cardiologist Dr. 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 No   Have you fallen and had an injury in the past year? No   Is patient a fall risk? No   Fall screen comments 3/22: Pt has not had any recent falls. Staff will continue to monitor and assess throghout phase II rehab.   Abuse Screen (yes response referral indicated)   Feels Unsafe at Home or Work/School no   Feels Threatened by Someone no   Does Anyone Try to Keep You From Having Contact with Others or Doing Things Outside Your Home? no   Physical Signs of Abuse Present no   Patient needs abuse support services and resources No   Pain   Patient Currently in Pain No   Physical Assessments   Incisions WNL   Edema None   Right Lung Sounds not assessed   Left Lung Sounds not assessed   Limitations No limitations   Comments 3/22: Arm restrictions have been lifted.   Individualized Treatment Plan   Monitored Sessions Scheduled 25   Monitored Sessions Attended 4   Oxygen   Supplemental Oxygen needed Yes   Oxygen liters at rest 0   Oxygen liters with activity 1.5-2L   Oxygen Usage Comment 3/22: Pt states O2 is not used during short walks, but does use his portable pulse concentrator when leaving the house on 2L.   Nutrition Management - Weight Management   Assessment Re-assessment   Age 61   Weight 61.7 kg (136 lb)   Height 1.778 m (5' 10\")   BMI (Calculated) 19.51   Initial Rate Your Plate Score. Dietary tool to assess eating patterns. Scores range from 24 to 72. The higher the score the healthier the eating pattern. 44   Weight Management Comments 3/22: Pt is not looking to lose weight, but does consume a well rouned diet.   Nutrition Management - Lipids   Lipids Labs Not Available   Prescribed Lipid Medication Yes   Statin Intensity Moderate Intensity   Lipid Comments 3/22: Pt is compliant with prescribed medication.   Nutrition Management - Diabetes   Diabetes No   Nutrition Management Summary "   Dietary Recommendations Low Sodium   Stages of Change for Diet Compliance Preparation   Interventions Planned Attend Nutrition Education Class(es)   Interventions In Progress or Completed Other (see comments)  (Pt denied nutrition education.)   Nutrition Summary Comments 3/22: Pt denied nutrition education from staff stating plenty was sent home during event discharge.   Nutrition Target Outcome Total Chol < 150, HDL > 40 (M), HDL > 50 (W), LDL < 70, Trig < 150   Psychosocial Management   Psychosocial Assessment Re-assessment   Is there history of clinical depression or increased risk of depression? No previous history   Current Level of Stress per Patient Report Mild   Current Coping Skills Has Positive Support System   Initial Patient Health Questionnaire -9 Score (PHQ-9) for depression. 5-9 Minimal symptoms, 10-14 Minor depression, 15-19 Major depression, moderately severe, > 20 Major depression, severe  5   Initial [facility] Survey score.  Quality of Life:   If total score > 25 review individual areas where patient rated a 4 or 5.  Consider patients current medical condition and what role that plays on the score.   Adjust treatment protocol to improve areas of concern.  Consider the following:  PHQ9 score, DASI, and re-assessment within the next 30 days to assist with developing treatments.  25   Stages of Change Preparation   Patient Goal No   Psychosocial Comments 3/22: Pt is prescribed ativan for anxiety. Pt states he has a great support system and is able to communicate any problems or issues with them.   Psychosocial Target Outcome Maximize coping skills   Other Core Components - Hypertension   History of or Diagnosis of Hypertension Yes   Currently taking Anti-Hypertensives Yes;Beta blocker   Hypertension Comments 3/22: Pt is compliant with prescribed medications.   Other Core Components - Tobacco   History of Tobacco Use Yes   Quit Date or Planned Quit Date 01/01/19  (When he quit smoking)    Tobacco Use Status Former (Quit > 6 mo ago);Currently smoking - everyday   Tobacco Habit Cigarettes;Chew   Tobacco Use per Day (average) 1-2ppd; <1 can daily   Years of Tobacco Use +40   Stages of Change Pre-Contemplation   Tobacco Comments 3/22: Pt quit smpking in 2019 by replacing it with chewing. He has not had the urge to smoke since, but chews < 1 can per day.   Other Core Components Summary   Interventions Planned Provide information on home blood pressure monitoring;Educate on importance of monitoring daily weight   Patient Goals No   Other Core Components Comments 3/22: Pt denies the need for further core component intervention at this time.   Other Core Components Target Outcome Compliance with Diet, Medications and Symptom Management to allow for stable Heart Failure   Activity/Exercise History   Activity/Exercise Assessment Re-assessment   Activity/Exercise Status prior to event? Was Physically Active   Number of Days Currently participating in Moderate Physical Activity? 2   Number of Days Currently performing  Aerobic Exercise (including rehab)? 2   Number of Minutes per Session Currently of Aerobic Exercise (average)? 34   Current Stage of Change (Physical Activity) Action   Current Stage of Change (Aerobic Exercise) Action   Activity/Exercise Comments 3/22: Pt achieves 2.2 METs with appropriate hemodynamic response. Staff will continue to increase workoads as tolerated.   Activity/Exercise Target Outcome An Accumulation of 150  Minutes of Aerobic Activity per Week   Exercise Assessment   6 Minute Walk Predicted - Gender Selection Male   6 Minute Walk Predicted (Male) 2006.99   6 Minute Walk Predicted (Female) 1759.57   Initial 6 Minute Walk Distance (Feet) 220 ft   Resting HR 96 bpm   Exercise  bpm   Post Exercise  bpm   Resting /78   Exercise /80   Post Exercise /88   Pre SpO2 95   While Exercising SpO2 96   Post SpO2 96   Effort Rating 6   Current MET Level 2.2   MET  Level Goal 3-4   ECG Rhythm Sinus rhythm;Other (Comment)  (hx of atrial fibrillation post MVR)   Ectopy PACs   Current Symptoms Dyspnea   Limitations/Restrictions None   Exercise Prescription   Mode Treadmill;Nustep;Recumbant bike;Arm Ergometer;Ambulation   Duration/Time 15-30 min   Frequency 2 days/week   THR (85% of age predicted max HR) 133.45   OMNI Effort Rating (0-10 Scale) 4-6/10   Progression Continuous bouts;Total exercise time of 20-30 minutes;Aerobic exercise to OMNI rating of 5-7, and heart rate at or below target   Comments 3/22: Staff will continue to increase time and workloads on machine as pt tolerates.   Recommended Home Exercise   Type of Exercise Walking   Frequency (days per week) 2-3   Duration (minutes per session) Intermittent   Effort Rating Recommended 4-6/10   30 Day Exercise Plan 3/22: Pt is encouraged to walk intermittently throughout the day as tolerated.   Current Home Exercise   Type of Exercise Walking   Follow-up/On-going Support   Provider follow-up needed on the following No follow-up needed   Learning Assessment   Learner Patient   Primary Language English   Preferred Learning Style Listening;Reading;Demonstration   Barriers to Learning No barriers noted   Patient Education   Title of Program Cardiac Rehab Education   Education recommended Anatomy and Physiology of the Heart;Blood Pressure;Exercise Principles;Heart Failure;Diabetes;Medication Overview;Nutrition;Risk Factors;Stress Management   Education classes attended Stress Management  (declined nutrition)   Education Comments 3/22: Pt will be provided education throughout phase II rehab.   Physician cosignature/electronic signature indicates approval of this ITP document. I have established, reviewed and made necessary changes to the individualized treatment plan and exercise prescription for this patient.

## 2022-03-24 ENCOUNTER — HOSPITAL ENCOUNTER (OUTPATIENT)
Dept: CARDIAC REHAB | Facility: HOSPITAL | Age: 62
Setting detail: THERAPIES SERIES
Discharge: HOME | End: 2022-03-24
Attending: THORACIC SURGERY (CARDIOTHORACIC VASCULAR SURGERY)
Payer: MEDICARE

## 2022-03-24 PROCEDURE — 999N000109 HC STATISTIC OP CR VISIT

## 2022-03-24 PROCEDURE — 93798 PHYS/QHP OP CAR RHAB W/ECG: CPT

## 2022-03-29 ENCOUNTER — HOSPITAL ENCOUNTER (OUTPATIENT)
Dept: CARDIAC REHAB | Facility: HOSPITAL | Age: 62
Setting detail: THERAPIES SERIES
Discharge: HOME | End: 2022-03-29
Attending: THORACIC SURGERY (CARDIOTHORACIC VASCULAR SURGERY)
Payer: MEDICARE

## 2022-03-29 PROCEDURE — 93798 PHYS/QHP OP CAR RHAB W/ECG: CPT

## 2022-03-29 PROCEDURE — 999N000109 HC STATISTIC OP CR VISIT

## 2022-03-31 ENCOUNTER — HOSPITAL ENCOUNTER (OUTPATIENT)
Dept: CARDIAC REHAB | Facility: HOSPITAL | Age: 62
Setting detail: THERAPIES SERIES
Discharge: HOME | End: 2022-03-31
Attending: THORACIC SURGERY (CARDIOTHORACIC VASCULAR SURGERY)
Payer: MEDICARE

## 2022-03-31 PROCEDURE — 999N000109 HC STATISTIC OP CR VISIT

## 2022-03-31 PROCEDURE — 93798 PHYS/QHP OP CAR RHAB W/ECG: CPT

## 2022-04-07 ENCOUNTER — HOSPITAL ENCOUNTER (OUTPATIENT)
Dept: CARDIAC REHAB | Facility: HOSPITAL | Age: 62
Setting detail: THERAPIES SERIES
Discharge: HOME | End: 2022-04-07
Attending: THORACIC SURGERY (CARDIOTHORACIC VASCULAR SURGERY)
Payer: MEDICARE

## 2022-04-07 PROCEDURE — 93798 PHYS/QHP OP CAR RHAB W/ECG: CPT

## 2022-04-07 PROCEDURE — 999N000109 HC STATISTIC OP CR VISIT

## 2022-04-14 ENCOUNTER — HOSPITAL ENCOUNTER (OUTPATIENT)
Dept: CARDIAC REHAB | Facility: HOSPITAL | Age: 62
Setting detail: THERAPIES SERIES
Discharge: HOME | End: 2022-04-14
Attending: THORACIC SURGERY (CARDIOTHORACIC VASCULAR SURGERY)
Payer: MEDICARE

## 2022-04-14 PROCEDURE — 999N000109 HC STATISTIC OP CR VISIT

## 2022-04-14 PROCEDURE — 93798 PHYS/QHP OP CAR RHAB W/ECG: CPT

## 2022-04-19 ENCOUNTER — HOSPITAL ENCOUNTER (OUTPATIENT)
Dept: CARDIAC REHAB | Facility: HOSPITAL | Age: 62
Setting detail: THERAPIES SERIES
Discharge: HOME | End: 2022-04-19
Attending: THORACIC SURGERY (CARDIOTHORACIC VASCULAR SURGERY)
Payer: MEDICARE

## 2022-04-19 VITALS — WEIGHT: 137 LBS | HEIGHT: 70 IN | BODY MASS INDEX: 19.61 KG/M2

## 2022-04-19 PROCEDURE — 93798 PHYS/QHP OP CAR RHAB W/ECG: CPT

## 2022-04-19 PROCEDURE — 999N000109 HC STATISTIC OP CR VISIT

## 2022-04-19 ASSESSMENT — 6 MINUTE WALK TEST (6MWT)
MALE CALC: 2004.36
FEMALE CALC: 1756.16
GENDER SELECTION: MALE
PREDICTED: 2016.58
TOTAL DISTANCE WALKED (FT): 220

## 2022-04-19 NOTE — PROGRESS NOTES
"   04/19/22 1100   Session   Session 60 Day Individualized Treatment Plan   Certified through this date 05/18/22   Cardiac Rehab Assessment   Cardiac Rehab Assessment 4/19: Patient has attended 10 exercise sessions thus far in Phase II Cardiac Rehab. Overall, he is doing well. Patient is currently able to achieve up to 3 METs and continues to try and increase his  MET level capability. Patient continues to use \"chew\" and is aware he should contemplate discontinuation.   Continued monitored therapy is necessary to help patient continue to increase his physical capabilities safely and to educate him on his personal risk factors for heart disease.    General Information   Treatment Diagnosis Valve Replacement   Date of Treatment Diagnosis 01/19/22   Comorbidities Pulmonary Disease   Other Medical History Severe COPD, JAYASHREE, HTN, anxiety   Lead up symptoms 2/24: Cardiology has been monitoring mitral regurgitation for awhile. Pt's shortness of breath started to become untolerable and VMR was scheduled.   Hospital Location [facility] Discharge Date 01/27/22   Signs and Symptoms Post Hospital Discharge SOB;Dizziness   Outpatient Cardiac Rehab Start Date 02/24/22   Primary Physician Dr. Varghese   Primary Physician Follow Up Completed  (3/8/2022)   Specialist Dr. Gonzalez   Specialist Follow Up Completed  (3/1/2022)   Cardiologist Dr. Meadows   Cardiologist Follow Up Completed   Risk Stratification High   Living and Work Status    Living Arrangements and Social Status house   Support System Live with an adult  (wife)   Return to Employment Retired   [facility]up[employer]   Preventative Medications   CMS recommended medications Beta Blocker;Antiplatelets;Ace inhibitors   Fall Risk Screen   Fall screen completed by Cardiac Rehab   Have you fallen 2 or more times in the past year? No   Have you fallen and had an injury in the past year? No   Is patient a fall risk? No   Fall screen comments " "4/19: Pt has not had any recent falls. [facility]   Abuse Screen (yes response referral indicated)   Feels Unsafe at Home or Work/School no   Feels Threatened by Someone no   Does Anyone Try to Keep You From Having Contact with Others or Doing Things Outside Your Home? no   Physical Signs of Abuse Present no   Patient needs abuse support services and resources No   Pain   Patient Currently in Pain No   Physical Assessments   Incisions Not assessed   Edema Not assessed   Right Lung Sounds not assessed   Left Lung Sounds not assessed   Limitations No limitations   Comments 4/19: Patient is able to use his arms in Cardiac Rehab.   Individualized Treatment Plan   Monitored Sessions Scheduled 25   Monitored Sessions Attended 10   Oxygen   Supplemental Oxygen needed Yes   Oxygen liters at rest 0   Oxygen liters with activity 1.5-2L   Oxygen Usage Comment 4/19: Pt states O2 is not used during short walks, but does use his portable pulse concentrator at 2LPM when leaving the house.   Nutrition Management - Weight Management   Assessment Re-assessment   Age 61   Weight 62.1 kg (137 lb)   Height 1.778 m (5' 10\")   BMI (Calculated) 19.66   Initial Rate Your Plate Score. Dietary tool to assess eating patterns. Scores range from 24 to 72. The higher the score the healthier the eating pattern. 44   Weight Management Comments 4/19: Pt is not looking to lose weight, but does consume a well rounded diet.   Nutrition Management - Lipids   Lipids Labs Not Available   Prescribed Lipid Medication Yes   Statin Intensity Moderate Intensity   Lipid Comments 4/19: Pt is compliant with prescribed medication.   Nutrition Management - Diabetes   Diabetes No   Nutrition Management Summary   Dietary Recommendations Low Sodium   Stages of Change for Diet Compliance Preparation   Interventions Planned Attend Nutrition Education Class(es)   Interventions In Progress or Completed Other (see " comments)  (Pt denied nutrition education.)   Nutrition Summary Comments 4/19: Pt declined nutrition education from staff stating plenty was sent home during event discharge.   Nutrition Target Outcome Total Chol < 150, HDL > 40 (M), HDL > 50 (W), LDL < 70, Trig < 150   Psychosocial Management   Psychosocial Assessment Re-assessment   Is there history of clinical depression or increased risk of depression? No previous history   Current Level of Stress per Patient Report Mild   Current Coping Skills Has Positive Support System   Initial Patient Health Questionnaire -9 Score (PHQ-9) for depression. 5-9 Minimal symptoms, 10-14 Minor depression, 15-19 Major depression, moderately severe, > 20 Major depression, severe  5   Initial [facility] Survey score.  Quality of Life:   If total score > 25 review individual areas where patient rated a 4 or 5.  Consider patients current medical condition and what role that plays on the score.   Adjust treatment protocol to improve areas of concern.  Consider the following:  PHQ9 score, DASI, and re-assessment within the next 30 days to assist with developing treatments.  25   Stages of Change Preparation   Patient Goal No   Psychosocial Comments 4/19: Pt is prescribed ativan for anxiety. Pt states he has a great support system and is able to communicate any problems or issues with them.   Psychosocial Target Outcome Maximize coping skills   Other Core Components - Hypertension   History of or Diagnosis of Hypertension Yes   Currently taking Anti-Hypertensives Yes;Beta blocker   Hypertension Comments 4/19: Pt is compliant with prescribed medications.   Other Core Components - Tobacco   History of Tobacco Use Yes   Quit Date or Planned Quit Date 01/01/19  (When he quit smoking)   Tobacco Use Status Former (Quit > 6 mo ago);Currently smoking - everyday   Tobacco Habit Cigarettes;Chew   Tobacco Use per Day (average) 1-2ppd; <1 can daily   Years of Tobacco Use +40   Stages of Change  Pre-Contemplation   Tobacco Comments 4/19: Pt quit smpking in 2019 by replacing it with chewing. He has not had the urge to smoke since, but chews < 1 can per day.   Other Core Components Summary   Interventions Planned Provide information on home blood pressure monitoring;Educate on importance of monitoring daily weight   Patient Goals No   Other Core Components Comments 4/19: Pt denies the need for further core component intervention at this time.   Other Core Components Target Outcome Compliance with Diet, Medications and Symptom Management to allow for stable Heart Failure   Activity/Exercise History   Activity/Exercise Assessment Re-assessment   Activity/Exercise Status prior to event? Was Physically Active   Number of Days Currently participating in Moderate Physical Activity? 2   Number of Days Currently performing  Aerobic Exercise (including rehab)? 2   Number of Minutes per Session Currently of Aerobic Exercise (average)? 34   Current Stage of Change (Physical Activity) Action   Current Stage of Change (Aerobic Exercise) Action   Activity/Exercise Comments 4/19: Pt achieves 3 METs with appropriate hemodynamic response. Staff will continue to increase workoads as tolerated.   Activity/Exercise Target Outcome An Accumulation of 150  Minutes of Aerobic Activity per Week   Exercise Assessment   6 Minute Walk Predicted - Gender Selection Male   6 Minute Walk Predicted (Male) 2004.36   6 Minute Walk Predicted (Female) 1756.16   Initial 6 Minute Walk Distance (Feet) 220 ft   Resting HR 96 bpm   Exercise  bpm   Post Exercise  bpm   Resting /78   Exercise /78   Post Exercise BP 90/76   Pre SpO2 97   While Exercising SpO2 95   Post SpO2 95   Effort Rating 5-6   Current MET Level 3.0   MET Level Goal 4-5   ECG Rhythm Sinus rhythm;Other (Comment)  (hx of atrial fibrillation post MVR)   Ectopy PACs   Current Symptoms Dyspnea   Limitations/Restrictions None   Exercise Prescription   Mode  Treadmill;Nustep;Recumbant bike;Arm Ergometer;Ambulation   Duration/Time 15-30 min   Frequency 2 days/week   THR (85% of age predicted max HR) 133.45   OMNI Effort Rating (0-10 Scale) 4-6/10   Progression Continuous bouts;Total exercise time of 20-30 minutes;Aerobic exercise to OMNI rating of 5-7, and heart rate at or below target   Comments 4/19: Staff will continue to increase time and workloads on machine as pt tolerates.   Recommended Home Exercise   Type of Exercise Walking   Frequency (days per week) 2-3   Duration (minutes per session) Intermittent   Effort Rating Recommended 4-6/10   30 Day Exercise Plan 4/19: Pt is encouraged to walk intermittently throughout the day as tolerated.   Current Home Exercise   Type of Exercise Walking   Follow-up/On-going Support   Provider follow-up needed on the following No follow-up needed   Learning Assessment   Learner Patient   Primary Language English   Preferred Learning Style Listening;Reading;Demonstration   Barriers to Learning No barriers noted   Patient Education   Title of Program Cardiac Rehab Education   Education recommended Anatomy and Physiology of the Heart;Blood Pressure;Exercise Principles;Heart Failure;Diabetes;Medication Overview;Nutrition;Risk Factors;Stress Management   Education classes attended Stress Management;Anatomy and Physiology of the Heart  (declined nutrition)   Education Comments 4/19: Pt will be provided education throughout phase II rehab.   Physician cosignature/electronic signature indicates approval of this ITP document. I have established, reviewed and made necessary changes to the individualized treatment plan and exercise prescription for this patient.

## 2022-04-21 ENCOUNTER — HOSPITAL ENCOUNTER (OUTPATIENT)
Dept: CARDIAC REHAB | Facility: HOSPITAL | Age: 62
Setting detail: THERAPIES SERIES
Discharge: HOME | End: 2022-04-21
Attending: THORACIC SURGERY (CARDIOTHORACIC VASCULAR SURGERY)
Payer: MEDICARE

## 2022-04-21 PROCEDURE — 999N000109 HC STATISTIC OP CR VISIT

## 2022-04-21 PROCEDURE — 93798 PHYS/QHP OP CAR RHAB W/ECG: CPT

## 2022-04-26 ENCOUNTER — HOSPITAL ENCOUNTER (OUTPATIENT)
Dept: CARDIAC REHAB | Facility: HOSPITAL | Age: 62
Setting detail: THERAPIES SERIES
Discharge: HOME | End: 2022-04-26
Attending: THORACIC SURGERY (CARDIOTHORACIC VASCULAR SURGERY)
Payer: MEDICARE

## 2022-04-26 PROCEDURE — 93798 PHYS/QHP OP CAR RHAB W/ECG: CPT

## 2022-04-26 PROCEDURE — 999N000109 HC STATISTIC OP CR VISIT

## 2022-04-28 ENCOUNTER — HOSPITAL ENCOUNTER (OUTPATIENT)
Dept: CARDIAC REHAB | Facility: HOSPITAL | Age: 62
Setting detail: THERAPIES SERIES
Discharge: HOME | End: 2022-04-28
Attending: THORACIC SURGERY (CARDIOTHORACIC VASCULAR SURGERY)
Payer: MEDICARE

## 2022-04-28 PROCEDURE — 93798 PHYS/QHP OP CAR RHAB W/ECG: CPT

## 2022-04-28 PROCEDURE — 999N000109 HC STATISTIC OP CR VISIT

## 2022-05-03 ENCOUNTER — HOSPITAL ENCOUNTER (OUTPATIENT)
Dept: CARDIAC REHAB | Facility: HOSPITAL | Age: 62
Setting detail: THERAPIES SERIES
Discharge: HOME | End: 2022-05-03
Attending: THORACIC SURGERY (CARDIOTHORACIC VASCULAR SURGERY)
Payer: MEDICARE

## 2022-05-03 PROCEDURE — 999N000109 HC STATISTIC OP CR VISIT

## 2022-05-03 PROCEDURE — 93798 PHYS/QHP OP CAR RHAB W/ECG: CPT

## 2022-05-05 ENCOUNTER — HOSPITAL ENCOUNTER (OUTPATIENT)
Dept: CARDIAC REHAB | Facility: HOSPITAL | Age: 62
Setting detail: THERAPIES SERIES
Discharge: HOME | End: 2022-05-05
Attending: THORACIC SURGERY (CARDIOTHORACIC VASCULAR SURGERY)
Payer: MEDICARE

## 2022-05-05 PROCEDURE — 999N000109 HC STATISTIC OP CR VISIT

## 2022-05-05 PROCEDURE — 93798 PHYS/QHP OP CAR RHAB W/ECG: CPT

## 2022-05-10 ENCOUNTER — HOSPITAL ENCOUNTER (OUTPATIENT)
Dept: CARDIAC REHAB | Facility: HOSPITAL | Age: 62
Setting detail: THERAPIES SERIES
Discharge: HOME | End: 2022-05-10
Attending: THORACIC SURGERY (CARDIOTHORACIC VASCULAR SURGERY)
Payer: MEDICARE

## 2022-05-10 PROCEDURE — 999N000109 HC STATISTIC OP CR VISIT

## 2022-05-10 PROCEDURE — 93798 PHYS/QHP OP CAR RHAB W/ECG: CPT

## 2022-05-12 ENCOUNTER — HOSPITAL ENCOUNTER (OUTPATIENT)
Dept: CARDIAC REHAB | Facility: HOSPITAL | Age: 62
Setting detail: THERAPIES SERIES
Discharge: HOME | End: 2022-05-12
Attending: THORACIC SURGERY (CARDIOTHORACIC VASCULAR SURGERY)
Payer: MEDICARE

## 2022-05-12 PROCEDURE — 93798 PHYS/QHP OP CAR RHAB W/ECG: CPT

## 2022-05-12 PROCEDURE — 999N000109 HC STATISTIC OP CR VISIT

## 2022-05-17 ENCOUNTER — HOSPITAL ENCOUNTER (OUTPATIENT)
Dept: CARDIAC REHAB | Facility: HOSPITAL | Age: 62
Setting detail: THERAPIES SERIES
Discharge: HOME | End: 2022-05-17
Attending: THORACIC SURGERY (CARDIOTHORACIC VASCULAR SURGERY)
Payer: MEDICARE

## 2022-05-17 PROCEDURE — 999N000109 HC STATISTIC OP CR VISIT

## 2022-05-17 PROCEDURE — 93798 PHYS/QHP OP CAR RHAB W/ECG: CPT

## 2022-05-18 VITALS — WEIGHT: 134 LBS | HEIGHT: 70 IN | BODY MASS INDEX: 19.18 KG/M2

## 2022-05-18 ASSESSMENT — 6 MINUTE WALK TEST (6MWT)
GENDER SELECTION: MALE
PREDICTED: 2024.5
MALE CALC: 2012.23
TOTAL DISTANCE WALKED (FT): 220
FEMALE CALC: 1766.4

## 2022-05-18 NOTE — PROGRESS NOTES
05/18/22 1000   Session   Session 90 Day Individualized Treatment Plan   Certified through this date 06/16/22   Cardiac Rehab Assessment   Cardiac Rehab Assessment 5/18 Pt has completed multiple Phase II sessions and provided educational information based on personal risk factors. Overall, pt is progressing well and is staring to understand the importance of using their oxygen for ADLs but occasionally has to be reassured. Pt continues to make changes to their aerobic exercise regimen and has been maintaining MET level of 3 without event.     Staff continues to monitor O2 saturation levels and notes they remain elevated in the mid to high 90s with 1.2-2 L. Staff tries to encourage pt to monitor their oxygen values and to keep Liter flow low due to complications with elevated oxygen and retaining of CO2.    General Information   Treatment Diagnosis Valve Replacement   Date of Treatment Diagnosis 01/19/22   Comorbidities Pulmonary Disease   Other Medical History Severe COPD, JAYASHREE, HTN, anxiety   Lead up symptoms 2/24: Cardiology has been monitoring mitral regurgitation for awhile. Pt's shortness of breath started to become untolerable and VMR was scheduled.   Hospital Location [facility] Discharge Date 01/27/22   Signs and Symptoms Post Hospital Discharge SOB;Dizziness   Outpatient Cardiac Rehab Start Date 02/24/22   Primary Physician Dr. Varghese   Primary Physician Follow Up Completed  (3/8/2022)   Specialist Dr. Gonzalez   Specialist Follow Up Completed  (3/1/2022)   Cardiologist Dr. Meadows   Cardiologist Follow Up Completed   Risk Stratification High   Living and Work Status    Living Arrangements and Social Status house   Support System Live with an adult  (wife)   Return to Employment Retired   Occupation Maintenance [employer]   Preventative Medications   CMS recommended medications Beta Blocker;Antiplatelets;Ace inhibitors   Fall Risk Screen   Fall screen completed by Cardiac  "Rehab   Have you fallen 2 or more times in the past year? No   Have you fallen and had an injury in the past year? No   Timed Up and Go score (seconds) NA   Is patient a fall risk? No   Fall screen comments 4/19: Pt has not had any recent falls. Staff will continue to monitor and assess throghout phase II rehab.   Abuse Screen (yes response referral indicated)   Feels Unsafe at Home or Work/School no   Feels Threatened by Someone no   Does Anyone Try to Keep You From Having Contact with Others or Doing Things Outside Your Home? no   Physical Signs of Abuse Present no   Patient needs abuse support services and resources No   Pain   Patient Currently in Pain No   Physical Assessments   Incisions Not assessed   Edema Not assessed   Right Lung Sounds not assessed   Left Lung Sounds not assessed   Limitations No limitations   Comments 5/18: Pt's physical assessments are WNL but staff continues to address O2 saturations with pt. Staff continue to note upper 90s with L flow. Staff will educate pt about the importance of watching O2 values.   Individualized Treatment Plan   Monitored Sessions Scheduled 25   Monitored Sessions Attended 18   Oxygen   Supplemental Oxygen needed Yes   Oxygen liters at rest 0   Oxygen liters with activity 2L   Oxygen Usage Comment 5/18: Pt is starting to realize the importance of ambulating with oxygen, even for short bouts. Staff and pt have had multiple conversations about oxygen saturation and anxiety but still continued to ambulate without oxygen. During pt's most recent pulmonary visit, MD discussed the importance of oxygen during ADLs.   Nutrition Management - Weight Management   Assessment Re-assessment   Age 61   Weight 60.8 kg (134 lb)   Height 1.778 m (5' 10\")   BMI (Calculated) 19.23   Initial Rate Your Plate Score. Dietary tool to assess eating patterns. Scores range from 24 to 72. The higher the score the healthier the eating pattern. 44   Weight Management Comments 5/18: Pt is not " looking to lose weight, but does consume a well rounded diet.   Nutrition Management - Lipids   Lipids Labs Not Available   Prescribed Lipid Medication Yes   Statin Intensity Moderate Intensity   Lipid Comments 5/18: Pt is compliant with prescribed medication.   Nutrition Management - Diabetes   Diabetes No   Nutrition Management Summary   Dietary Recommendations Low Sodium   Stages of Change for Diet Compliance Preparation   Interventions Planned Attend Nutrition Education Class(es)   Interventions In Progress or Completed Other (see comments)  (Pt denied nutrition education.)   Nutrition Summary Comments 5/18: Pt declined nutrition education from staff stating plenty was sent home during event discharge.   Nutrition Target Outcome Total Chol < 150, HDL > 40 (M), HDL > 50 (W), LDL < 70, Trig < 150   Psychosocial Management   Psychosocial Assessment Re-assessment   Is there history of clinical depression or increased risk of depression? No previous history   Current Level of Stress per Patient Report Mild   Current Coping Skills Has Positive Support System   Initial Patient Health Questionnaire -9 Score (PHQ-9) for depression. 5-9 Minimal symptoms, 10-14 Minor depression, 15-19 Major depression, moderately severe, > 20 Major depression, severe  5   Initial [employer] Survey score.  Quality of Life:   If total score > 25 review individual areas where patient rated a 4 or 5.  Consider patients current medical condition and what role that plays on the score.   Adjust treatment protocol to improve areas of concern.  Consider the following:  PHQ9 score, DASI, and re-assessment within the next 30 days to assist with developing treatments.  25   Stages of Change Preparation   Interventions Planned Reassess PHQ-9 and/or [employer] Surveys if outside of defined limits   Patient Goal No   Psychosocial Comments 5/18: Pt is prescribed ativan for anxiety. Pt states he has a great support system and is able to communicate  any problems or issues with them.   Psychosocial Target Outcome Maximize coping skills   Other Core Components - Hypertension   History of or Diagnosis of Hypertension Yes   Currently taking Anti-Hypertensives Yes;Beta blocker   Hypertension Comments 5/18: Pt is compliant with prescribed medications.   Other Core Components - Tobacco   History of Tobacco Use Yes   Quit Date or Planned Quit Date 01/01/19  (When he quit smoking)   Tobacco Use Status Former (Quit > 6 mo ago);Currently smoking - everyday   Tobacco Habit Cigarettes;Chew   Tobacco Use per Day (average) 1-2ppd; <1 can daily   Years of Tobacco Use +40   Stages of Change Pre-Contemplation   Tobacco Comments 5/18: Pt quit smpking in 2019 by replacing it with chewing. He has not had the urge to smoke since, but chews < 1 can per day.   Other Core Components Summary   Interventions Planned Provide information on home blood pressure monitoring;Educate on importance of monitoring daily weight   Patient Goals No   Other Core Components Comments 5/18: Pt denies the need for further core component intervention at this time.   Other Core Components Target Outcome Compliance with Diet, Medications and Symptom Management to allow for stable Heart Failure   Activity/Exercise History   Activity/Exercise Assessment Re-assessment   Activity/Exercise Status prior to event? Was Physically Active   Number of Days Currently participating in Moderate Physical Activity? 2   Number of Days Currently performing  Aerobic Exercise (including rehab)? 2   Number of Minutes per Session Currently of Aerobic Exercise (average)? 42   Current Stage of Change (Physical Activity) Action   Current Stage of Change (Aerobic Exercise) Action   Activity/Exercise Comments 5/18: Pt achieves 3 METs with appropriate hemodynamic response. Staff will continue to increase workoads as tolerated.   Activity/Exercise Target Outcome An Accumulation of 150  Minutes of Aerobic Activity per Week   Exercise  Assessment   6 Minute Walk Predicted - Gender Selection Male   6 Minute Walk Predicted (Male) 2012.23   6 Minute Walk Predicted (Female) 1766.4   Initial 6 Minute Walk Distance (Feet) 220 ft   Resting HR 92 bpm   Exercise  bpm   Post Exercise  bpm   Resting /82   Exercise /78   Post Exercise /76   Pre SpO2 99   While Exercising SpO2 99   Post SpO2 97   Effort Rating 6   Current MET Level 3   MET Level Goal 4-5   ECG Rhythm Sinus rhythm;Other (Comment)  (hx of atrial fibrillation post MVR)   Ectopy PACs   Current Symptoms Dyspnea   Limitations/Restrictions None   Exercise Prescription   Mode Treadmill;Nustep;Recumbant bike;Arm Ergometer;Ambulation   Duration/Time 15-30 min   Frequency 2 days/week   THR (85% of age predicted max HR) 133.45   OMNI Effort Rating (0-10 Scale) 4-6/10   Progression Continuous bouts;Total exercise time of 20-30 minutes;Aerobic exercise to OMNI rating of 5-7, and heart rate at or below target   Comments 5/18: Staff will continue to increase time and workloads on machine as pt tolerates.   Recommended Home Exercise   Type of Exercise Walking   Frequency (days per week) 2-3   Duration (minutes per session) Intermittent   Effort Rating Recommended 4-6/10   30 Day Exercise Plan 5/18: Pt is encouraged to walk intermittently throughout the day as tolerated.   Current Home Exercise   Type of Exercise Walking   Follow-up/On-going Support   Provider follow-up needed on the following No follow-up needed   Learning Assessment   Learner Patient   Primary Language English   Preferred Learning Style Listening;Reading;Demonstration   Barriers to Learning No barriers noted   Patient Education   Title of Program Cardiac Rehab Education   Education recommended Anatomy and Physiology of the Heart;Blood Pressure;Exercise Principles;Heart Failure;Diabetes;Medication Overview;Nutrition;Risk Factors;Stress Management   Education classes attended Stress Management;Anatomy and  Physiology of the Heart;Blood Pressure;Medication Overview;Nutrition;Risk Factors   Education Comments 5/18: Pt will be provided education throughout phase II rehab.   Physician cosignature/electronic signature indicates approval of this ITP document. I have established, reviewed and made necessary changes to the individualized treatment plan and exercise prescription for this patient.

## 2022-05-19 ENCOUNTER — HOSPITAL ENCOUNTER (OUTPATIENT)
Dept: CARDIAC REHAB | Facility: HOSPITAL | Age: 62
Setting detail: THERAPIES SERIES
Discharge: HOME | End: 2022-05-19
Attending: THORACIC SURGERY (CARDIOTHORACIC VASCULAR SURGERY)
Payer: MEDICARE

## 2022-05-19 PROCEDURE — 999N000109 HC STATISTIC OP CR VISIT

## 2022-05-19 PROCEDURE — 93798 PHYS/QHP OP CAR RHAB W/ECG: CPT

## 2022-05-24 ENCOUNTER — HOSPITAL ENCOUNTER (OUTPATIENT)
Dept: CARDIAC REHAB | Facility: HOSPITAL | Age: 62
Setting detail: THERAPIES SERIES
Discharge: HOME | End: 2022-05-24
Attending: THORACIC SURGERY (CARDIOTHORACIC VASCULAR SURGERY)
Payer: MEDICARE

## 2022-05-24 PROCEDURE — 999N000109 HC STATISTIC OP CR VISIT

## 2022-05-24 PROCEDURE — 93798 PHYS/QHP OP CAR RHAB W/ECG: CPT

## 2022-05-26 ENCOUNTER — HOSPITAL ENCOUNTER (OUTPATIENT)
Dept: CARDIAC REHAB | Facility: HOSPITAL | Age: 62
Setting detail: THERAPIES SERIES
Discharge: HOME | End: 2022-05-26
Attending: THORACIC SURGERY (CARDIOTHORACIC VASCULAR SURGERY)
Payer: MEDICARE

## 2022-05-26 PROCEDURE — 93798 PHYS/QHP OP CAR RHAB W/ECG: CPT

## 2022-05-26 PROCEDURE — 999N000109 HC STATISTIC OP CR VISIT

## 2022-05-31 ENCOUNTER — HOSPITAL ENCOUNTER (OUTPATIENT)
Dept: CARDIAC REHAB | Facility: HOSPITAL | Age: 62
Setting detail: THERAPIES SERIES
Discharge: HOME | End: 2022-05-31
Attending: THORACIC SURGERY (CARDIOTHORACIC VASCULAR SURGERY)
Payer: MEDICARE

## 2022-05-31 PROCEDURE — 999N000108 HC STATISTIC OP CARDIAC VISIT #2

## 2022-05-31 PROCEDURE — 93798 PHYS/QHP OP CAR RHAB W/ECG: CPT

## 2022-06-02 ENCOUNTER — HOSPITAL ENCOUNTER (OUTPATIENT)
Dept: CARDIAC REHAB | Facility: HOSPITAL | Age: 62
Setting detail: THERAPIES SERIES
Discharge: HOME | End: 2022-06-02
Attending: THORACIC SURGERY (CARDIOTHORACIC VASCULAR SURGERY)
Payer: MEDICARE

## 2022-06-02 PROCEDURE — 93798 PHYS/QHP OP CAR RHAB W/ECG: CPT

## 2022-06-02 PROCEDURE — 999N000109 HC STATISTIC OP CR VISIT

## 2022-06-07 ENCOUNTER — HOSPITAL ENCOUNTER (OUTPATIENT)
Dept: CARDIAC REHAB | Facility: HOSPITAL | Age: 62
Setting detail: THERAPIES SERIES
Discharge: HOME | End: 2022-06-07
Attending: THORACIC SURGERY (CARDIOTHORACIC VASCULAR SURGERY)
Payer: MEDICARE

## 2022-06-07 PROCEDURE — 999N000109 HC STATISTIC OP CR VISIT

## 2022-06-07 PROCEDURE — 93798 PHYS/QHP OP CAR RHAB W/ECG: CPT

## 2022-06-09 ENCOUNTER — HOSPITAL ENCOUNTER (OUTPATIENT)
Dept: CARDIAC REHAB | Facility: HOSPITAL | Age: 62
Setting detail: THERAPIES SERIES
Discharge: HOME | End: 2022-06-09
Attending: THORACIC SURGERY (CARDIOTHORACIC VASCULAR SURGERY)
Payer: MEDICARE

## 2022-06-09 VITALS — WEIGHT: 133.4 LBS | HEIGHT: 70 IN | BODY MASS INDEX: 19.1 KG/M2

## 2022-06-09 PROCEDURE — 999N000109 HC STATISTIC OP CR VISIT

## 2022-06-09 PROCEDURE — 93798 PHYS/QHP OP CAR RHAB W/ECG: CPT

## 2022-06-09 ASSESSMENT — 6 MINUTE WALK TEST (6MWT)
MALE CALC: 2013.81
TOTAL DISTANCE WALKED (FT): 950
GENDER SELECTION: MALE
FEMALE CALC: 1768.45
PREDICTED: 2026.09
TOTAL DISTANCE WALKED (FT): 220

## 2022-06-09 NOTE — PROGRESS NOTES
06/09/22 0800   Session   Session Discharge Note   Certified through this date 07/08/22   Cardiac Rehab Assessment   Cardiac Rehab Assessment 6/9: Keith is a patient who has attended 25 exercise and 6 education sessions.  He has made significant gains on his 6MWT from not being able to complete and only walking 320 feet to completing 6MWT with a distance of 950 feet.  Initially completing 20 minutes of aerobic exercise at 2.2 METs to completing 35 minutes of aerobic exercise at 2.5 METs.      The PT was given instructions on frequency, intensity, and duration for continued exercise as well as muscle conditioning and stretching exercises.  Your PT plans to continue walking occasionally with physical activity but declines aerobic activity.     General Information   Treatment Diagnosis Valve Replacement   Date of Treatment Diagnosis 01/19/22   Comorbidities Pulmonary Disease   Other Medical History Severe COPD, JAYASHREE, HTN, anxiety   Lead up symptoms 6/9: Cardiology has been monitoring mitral regurgitation for awhile. Pt's shortness of breath started to become untolerable and VMR was scheduled.   Hospital Location [facility] Discharge Date 01/27/22   Signs and Symptoms Post Hospital Discharge SOB;Dizziness   Comments 6/9Patient has been using 1.5 to 2 liters of oxygen during cardiac Rehab and has still been experiencing SOB   Outpatient Cardiac Rehab Start Date 02/24/22   Primary Physician Dr. Varghese   Primary Physician Follow Up Completed   Specialist Dr. Gonzalez   Specialist Follow Up Completed   Cardiologist Dr. Meadows   Cardiologist Follow Up Completed   Risk Stratification High   Living and Work Status    Living Arrangements and Social Status house   Support System Live with an adult   Return to Employment Retired   Occupation Maintenance [employer]   Preventative Medications   CMS recommended medications Beta Blocker;Antiplatelets;Ace inhibitors   Fall Risk Screen   Fall screen  "completed by Cardiac Rehab   Have you fallen 2 or more times in the past year? No   Have you fallen and had an injury in the past year? No   Timed Up and Go score (seconds) NA   Is patient a fall risk? No   Fall screen comments 6/9: Pt has not had any recent falls. Staff will continue to monitor and assess throghout phase II rehab.   Abuse Screen (yes response referral indicated)   Feels Unsafe at Home or Work/School no   Feels Threatened by Someone no   Does Anyone Try to Keep You From Having Contact with Others or Doing Things Outside Your Home? no   Physical Signs of Abuse Present no   Patient needs abuse support services and resources No   Pain   Patient Currently in Pain No   Physical Assessments   Incisions Not assessed   Edema +1 Trace   Right Lung Sounds diminished   Left Lung Sounds diminished   Limitations No limitations   Comments 6/9: Patients physcial assessment is within normal limits   Individualized Treatment Plan   Monitored Sessions Scheduled 25   Monitored Sessions Attended 25   Oxygen   Supplemental Oxygen needed Yes   Oxygen liters at rest 1.5   Oxygen liters with activity 2L   On Oxygen Hr/Day 24   Oxygen Usage Comment 6/9: Patient states he is using his oxygen continuously to help with SOB   Nutrition Management - Weight Management   Age 61   Weight 60.5 kg (133 lb 6.4 oz)   Height 1.778 m (5' 10\")   BMI (Calculated) 19.14   Initial Rate Your Plate Score. Dietary tool to assess eating patterns. Scores range from 24 to 72. The higher the score the healthier the eating pattern. 44   Discharge Rate Your Plate Score 40   Weight Management Comments 6/9: Pt is not looking to lose weight, but does consume a well rounded diet.   Nutrition Management - Lipids   Lipids Labs Not Available   Prescribed Lipid Medication Yes   Statin Intensity Moderate Intensity   Lipid Comments 6/9: Patient is compliant with current medications   Nutrition Management - Diabetes   Diabetes No   Nutrition Management Summary "   Dietary Recommendations Low Sodium   Stages of Change for Diet Compliance Preparation   Interventions Planned Attend Nutrition Education Class(es)   Interventions In Progress or Completed Other (see comments)  (Patient declined nutrition information)   Nutrition Summary Comments 6/9 Patient declined nutrtion hand outs and goals   Nutrition Target Outcome Total Chol < 150, HDL > 40 (M), HDL > 50 (W), LDL < 70, Trig < 150   Psychosocial Management   Psychosocial Assessment Re-assessment   Is there history of clinical depression or increased risk of depression? No previous history   Current Level of Stress per Patient Report Mild   Current Coping Skills Has Positive Support System   Initial Patient Health Questionnaire -9 Score (PHQ-9) for depression. 5-9 Minimal symptoms, 10-14 Minor depression, 15-19 Major depression, moderately severe, > 20 Major depression, severe  5   Discharge PHQ-9 Score for Depression 0   Initial [employer] COOP Survey score.  Quality of Life:   If total score > 25 review individual areas where patient rated a 4 or 5.  Consider patients current medical condition and what role that plays on the score.   Adjust treatment protocol to improve areas of concern.  Consider the following:  PHQ9 score, DASI, and re-assessment within the next 30 days to assist with developing treatments.  25   Discharge [employer] COOP Survey Score 23   Stages of Change Preparation   Interventions Planned Reassess PHQ-9 and/or [employer] COOP Surveys if outside of defined limits   Interventions In Progress or Completed Patient is able to identify positive support system   Patient Goal No   Psychosocial Comments 6/9 Patient is prescribed ativan for anxiety and has a good support system with family nearby   Psychosocial Target Outcome Maximize coping skills   Other Core Components - Hypertension   History of or Diagnosis of Hypertension Yes   Currently taking Anti-Hypertensives Yes;Beta blocker   Hypertension Comments  6/9:Patient is compliant with prescribed medications   Other Core Components - Tobacco   History of Tobacco Use Yes   Quit Date or Planned Quit Date 01/01/19   Tobacco Use Status Former (Quit > 6 mo ago);Currently smoking - everyday   Tobacco Habit Cigarettes;Chew   Tobacco Use per Day (average) 1-2ppd; <1 can daily   Years of Tobacco Use +40   Stages of Change Pre-Contemplation   Tobacco Comments 6/9: Pt quit smpking in 2019 by replacing it with chewing. He has not had the urge to smoke since, but chews < 1 can per day.   Other Core Components Summary   Interventions Planned Provide information on home blood pressure monitoring;Educate on importance of monitoring daily weight   Interventions In Progress or Completed Educated on importance of monitoring daily weight;None: Patient remains in pre-contemplation for smoking cessation;Educated on importance of maintaining low sodium diet   Patient Goals No   Other Core Components Comments 6/9: Pt denies the need for further core component intervention at this time.   Other Core Components Target Outcome Compliance with Diet, Medications and Symptom Management to allow for stable Heart Failure   Activity/Exercise History   Activity/Exercise Assessment Re-assessment   Activity/Exercise Status prior to event? Was Physically Active   Number of Days Currently participating in Moderate Physical Activity? 2   Number of Days Currently performing  Aerobic Exercise (including rehab)? 7   Number of Minutes per Session Currently of Aerobic Exercise (average)? 45   Current Stage of Change (Physical Activity) Action   Current Stage of Change (Aerobic Exercise) Pre-Contemplation   Activity/Exercise Comments 6/9: Pt achieves 3 METs with appropriate hemodynamic response. Staff will continue to increase workoads as tolerated.   Activity/Exercise Target Outcome An Accumulation of 150  Minutes of Aerobic Activity per Week   Exercise Assessment   6 Minute Walk Predicted - Gender Selection  Male   6 Minute Walk Predicted (Male) 2013.81   6 Minute Walk Predicted (Female) 1768.45   Initial 6 Minute Walk Distance (Feet) 220 ft   Discharge 6 Minute Walk Distance (Feet) 950   Resting HR 87 bpm   Exercise  bpm   Post Exercise HR 94 bpm   Resting /82   Exercise /82   Post Exercise /76   Pre SpO2 99   While Exercising SpO2 98   Post SpO2 98   Effort Rating 6   Current MET Level 3   MET Level Goal 4-5   ECG Rhythm Sinus rhythm;Other (Comment)  (History of atrial fib)   Ectopy PACs   Current Symptoms Dyspnea   Limitations/Restrictions None   Exercise Prescription   Mode Treadmill;Nustep;Recumbant bike;Arm Ergometer;Ambulation   Duration/Time 15-30 min   Frequency 2 days/week   THR (85% of age predicted max HR) 133.45   OMNI Effort Rating (0-10 Scale) 4-6/10   Progression Continuous bouts;Total exercise time of 20-30 minutes;Aerobic exercise to OMNI rating of 5-7, and heart rate at or below target   Comments 6/9: Staff will continue to increase time and workloads on machine as pt tolerates.   Recommended Home Exercise   Type of Exercise Walking   Frequency (days per week) 2-3   Duration (minutes per session) Intermittent   Effort Rating Recommended 4-6/10   30 Day Exercise Plan 6/9: Pt is encouraged to walk intermittently throughout the day as tolerated.   Current Home Exercise   Type of Exercise None   Frequency (days per week) 0   Duration (minutes per session) 0   Follow-up/On-going Support   Provider follow-up needed on the following No follow-up needed   Learning Assessment   Learner Patient   Primary Language English   Preferred Learning Style Listening;Reading;Demonstration   Barriers to Learning No barriers noted   Patient Education   Title of Program Cardiac Rehab Education   Education recommended Anatomy and Physiology of the Heart;Blood Pressure;Exercise Principles;Heart Failure;Diabetes;Medication Overview;Nutrition;Risk Factors;Stress Management   Education classes attended  Stress Management;Anatomy and Physiology of the Heart;Blood Pressure;Medication Overview;Nutrition;Risk Factors   Education Comments 6/9: Patient was provided education.   Physician cosignature/electronic signature indicates agreements with the ITP document and approval of discharge.

## 2023-06-23 ENCOUNTER — APPOINTMENT (OUTPATIENT)
Dept: GENERAL RADIOLOGY | Facility: HOSPITAL | Age: 63
End: 2023-06-23
Attending: NURSE PRACTITIONER
Payer: COMMERCIAL

## 2023-06-23 ENCOUNTER — APPOINTMENT (OUTPATIENT)
Dept: ULTRASOUND IMAGING | Facility: HOSPITAL | Age: 63
End: 2023-06-23
Attending: NURSE PRACTITIONER
Payer: COMMERCIAL

## 2023-06-23 ENCOUNTER — HOSPITAL ENCOUNTER (EMERGENCY)
Facility: HOSPITAL | Age: 63
Discharge: HOME | End: 2023-06-23
Attending: NURSE PRACTITIONER | Admitting: NURSE PRACTITIONER
Payer: COMMERCIAL

## 2023-06-23 VITALS
TEMPERATURE: 96.9 F | SYSTOLIC BLOOD PRESSURE: 129 MMHG | RESPIRATION RATE: 18 BRPM | OXYGEN SATURATION: 99 % | DIASTOLIC BLOOD PRESSURE: 91 MMHG | HEART RATE: 112 BPM

## 2023-06-23 DIAGNOSIS — F41.1 ANXIETY STATE: ICD-10-CM

## 2023-06-23 PROBLEM — Z95.2 S/P MVR (MITRAL VALVE REPLACEMENT): Status: ACTIVE | Noted: 2022-01-19

## 2023-06-23 PROBLEM — J44.9 OSA AND COPD OVERLAP SYNDROME (H): Status: ACTIVE | Noted: 2017-02-06

## 2023-06-23 PROBLEM — G47.33 OSA AND COPD OVERLAP SYNDROME (H): Status: ACTIVE | Noted: 2017-02-06

## 2023-06-23 PROBLEM — M54.12 CERVICAL RADICULOPATHY AT C6: Status: ACTIVE | Noted: 2021-03-09

## 2023-06-23 PROBLEM — R09.02 OXYGEN DESATURATION: Status: ACTIVE | Noted: 2017-04-10

## 2023-06-23 PROBLEM — I50.32 CHRONIC DIASTOLIC HEART FAILURE (H): Status: ACTIVE | Noted: 2018-11-15

## 2023-06-23 PROBLEM — Z86.79 HX OF ATRIAL FIBRILLATION WITHOUT CURRENT MEDICATION: Status: ACTIVE | Noted: 2022-01-26

## 2023-06-23 LAB
ALBUMIN SERPL BCG-MCNC: 4.5 G/DL (ref 3.5–5.2)
ALP SERPL-CCNC: 63 U/L (ref 40–129)
ALT SERPL W P-5'-P-CCNC: 20 U/L (ref 0–70)
ANION GAP SERPL CALCULATED.3IONS-SCNC: 13 MMOL/L (ref 7–15)
AST SERPL W P-5'-P-CCNC: 25 U/L (ref 0–45)
BASOPHILS # BLD AUTO: 0.1 10E3/UL (ref 0–0.2)
BASOPHILS NFR BLD AUTO: 1 %
BILIRUB SERPL-MCNC: 1.4 MG/DL
BUN SERPL-MCNC: 10.7 MG/DL (ref 8–23)
CALCIUM SERPL-MCNC: 10.4 MG/DL (ref 8.8–10.2)
CHLORIDE SERPL-SCNC: 98 MMOL/L (ref 98–107)
CREAT SERPL-MCNC: 0.79 MG/DL (ref 0.67–1.17)
D DIMER PPP FEU-MCNC: 0.49 UG/ML FEU (ref 0–0.5)
DEPRECATED HCO3 PLAS-SCNC: 29 MMOL/L (ref 22–29)
EOSINOPHIL # BLD AUTO: 0.1 10E3/UL (ref 0–0.7)
EOSINOPHIL NFR BLD AUTO: 2 %
ERYTHROCYTE [DISTWIDTH] IN BLOOD BY AUTOMATED COUNT: 12 % (ref 10–15)
GFR SERPL CREATININE-BSD FRML MDRD: >90 ML/MIN/1.73M2
GLUCOSE SERPL-MCNC: 123 MG/DL (ref 70–99)
HCT VFR BLD AUTO: 44.2 % (ref 40–53)
HGB BLD-MCNC: 14.6 G/DL (ref 13.3–17.7)
HOLD SPECIMEN: NORMAL
HOLD SPECIMEN: NORMAL
IMM GRANULOCYTES # BLD: 0 10E3/UL
IMM GRANULOCYTES NFR BLD: 0 %
LYMPHOCYTES # BLD AUTO: 1.3 10E3/UL (ref 0.8–5.3)
LYMPHOCYTES NFR BLD AUTO: 21 %
MAGNESIUM SERPL-MCNC: 1.8 MG/DL (ref 1.7–2.3)
MCH RBC QN AUTO: 30.8 PG (ref 26.5–33)
MCHC RBC AUTO-ENTMCNC: 33 G/DL (ref 31.5–36.5)
MCV RBC AUTO: 93 FL (ref 78–100)
MONOCYTES # BLD AUTO: 0.6 10E3/UL (ref 0–1.3)
MONOCYTES NFR BLD AUTO: 10 %
NEUTROPHILS # BLD AUTO: 4.2 10E3/UL (ref 1.6–8.3)
NEUTROPHILS NFR BLD AUTO: 66 %
NRBC # BLD AUTO: 0 10E3/UL
NRBC BLD AUTO-RTO: 0 /100
NT-PROBNP SERPL-MCNC: 109 PG/ML (ref 0–900)
PLATELET # BLD AUTO: 198 10E3/UL (ref 150–450)
POTASSIUM SERPL-SCNC: 4.2 MMOL/L (ref 3.4–5.3)
PROT SERPL-MCNC: 7.3 G/DL (ref 6.4–8.3)
RBC # BLD AUTO: 4.74 10E6/UL (ref 4.4–5.9)
SODIUM SERPL-SCNC: 140 MMOL/L (ref 136–145)
TROPONIN T SERPL HS-MCNC: 17 NG/L
WBC # BLD AUTO: 6.3 10E3/UL (ref 4–11)

## 2023-06-23 PROCEDURE — 36415 COLL VENOUS BLD VENIPUNCTURE: CPT | Performed by: NURSE PRACTITIONER

## 2023-06-23 PROCEDURE — 84484 ASSAY OF TROPONIN QUANT: CPT | Performed by: NURSE PRACTITIONER

## 2023-06-23 PROCEDURE — 83735 ASSAY OF MAGNESIUM: CPT | Performed by: NURSE PRACTITIONER

## 2023-06-23 PROCEDURE — 80053 COMPREHEN METABOLIC PANEL: CPT | Performed by: NURSE PRACTITIONER

## 2023-06-23 PROCEDURE — 71046 X-RAY EXAM CHEST 2 VIEWS: CPT

## 2023-06-23 PROCEDURE — 85025 COMPLETE CBC W/AUTO DIFF WBC: CPT | Performed by: NURSE PRACTITIONER

## 2023-06-23 PROCEDURE — 99284 EMERGENCY DEPT VISIT MOD MDM: CPT | Mod: 25 | Performed by: NURSE PRACTITIONER

## 2023-06-23 PROCEDURE — 99285 EMERGENCY DEPT VISIT HI MDM: CPT | Mod: 25

## 2023-06-23 PROCEDURE — 93308 TTE F-UP OR LMTD: CPT | Mod: TC | Performed by: NURSE PRACTITIONER

## 2023-06-23 PROCEDURE — 93010 ELECTROCARDIOGRAM REPORT: CPT | Performed by: INTERNAL MEDICINE

## 2023-06-23 PROCEDURE — 93005 ELECTROCARDIOGRAM TRACING: CPT

## 2023-06-23 PROCEDURE — 93308 TTE F-UP OR LMTD: CPT | Mod: TC

## 2023-06-23 PROCEDURE — 83880 ASSAY OF NATRIURETIC PEPTIDE: CPT | Performed by: NURSE PRACTITIONER

## 2023-06-23 PROCEDURE — 85379 FIBRIN DEGRADATION QUANT: CPT | Performed by: NURSE PRACTITIONER

## 2023-06-23 RX ORDER — MORPHINE SULFATE 20 MG/ML
2.5 SOLUTION ORAL
COMMUNITY

## 2023-06-23 ASSESSMENT — ENCOUNTER SYMPTOMS
HEMATOLOGIC/LYMPHATIC NEGATIVE: 1
NEUROLOGICAL NEGATIVE: 1
NERVOUS/ANXIOUS: 1
ENDOCRINE NEGATIVE: 1
MUSCULOSKELETAL NEGATIVE: 1
ALLERGIC/IMMUNOLOGIC NEGATIVE: 1
EYES NEGATIVE: 1
GASTROINTESTINAL NEGATIVE: 1
CONSTITUTIONAL NEGATIVE: 1
SHORTNESS OF BREATH: 1

## 2023-06-23 NOTE — DISCHARGE INSTRUCTIONS
Follow-up with your primary care provider for reevaluation.  Contact your primary care provider if you have any questions or concerns.  Do not hesitate to return to the ER if any new or worsening symptoms.     Please read the attached instructions (if any).  They highlight more specific treatments and interventions for you at home.              Thank you for letting me participate in your care and wish you a fast and uneventful recovery,    Keith CORRAL CNP    Do not hesitate to contact me with questions or concerns.  [email or web address]  [email or web address]

## 2023-06-23 NOTE — ED PROVIDER NOTES
"  History     Chief Complaint   Patient presents with     Copd Exacerbation     C/o breathing worse over time due to his COPD. Sats 97% on 02 via n/c. Notes needing to use his oxygen constantly. Denies chest pain. C/o \"numb sensation\" in his back. States started morphine and worried about \"suppression\". C/o ankles \" a little bit of swelling\".      HPI   Keith Ingram is a 62 year old individual with history of centrilobular emphysema, chronic diastolic heart failure, hypertension, COPD on home O2, mitral valve replacement, atrial fibrillation history, comes in with complaints of worsening shortness of breath and lower extremity swelling.   Patient states he was started on morphine for air hunger.  States that it has been working but feels that he needs his oxygen all the time which is new.  No chest pain reported.  No cough or fever.  States that he has some lower extremity swelling which is also new.  No paresthesias or weakness is reported.    Allergies:  Allergies   Allergen Reactions     Lisinopril Other (See Comments)     Dizziness         Problem List:    Patient Active Problem List    Diagnosis Date Noted     Hx of atrial fibrillation without current medication 01/26/2022     Priority: Medium     Formatting of this note might be different from the original.  Post op after MVR       S/P MVR (mitral valve replacement) 01/19/2022     Priority: Medium     Formatting of this note might be different from the original.  1/19/22: For MV regurg       Cervical radiculopathy at C6 03/09/2021     Priority: Medium     Formatting of this note might be different from the original.  modest bilateral foraminal narrowing at C5-6       Chronic diastolic heart failure (H) 11/15/2018     Priority: Medium     Oxygen desaturation 04/10/2017     Priority: Medium     JAYASHREE and COPD overlap syndrome (H) 02/06/2017     Priority: Medium     Formatting of this note might be different from the original.  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  S/P release 2013       Essential hypertension 11/29/2010     Priority: Medium     Centrilobular emphysema (H) 08/06/2010     Priority: Medium     Formatting of this note might be different from the original.  Severe obstruction on spirometry  Started morphine sulfate for palliation on dyspnea       Degeneration of lumbar or lumbosacral intervertebral disc 09/20/2006     Priority: Medium        Past Medical History:    Past Medical History:   Diagnosis Date     COPD (chronic obstructive pulmonary disease) (H)      ETOH abuse      Heart murmur      Hypertension      Sleep apnea        Past Surgical History:    Past Surgical History:   Procedure Laterality Date     COLONOSCOPY N/A 1/19/2018    Procedure: COLONOSCOPY;  COLONOSCOPY with Polypectomy and Resolution Clipping;  Surgeon: Young Souza MD;  Location: [facility] OR     COLONOSCOPY - HIM SCAN  09/06/2007    Colonoscopy, REMV LESN, forcep/cautery  ([facility] [place])       Family History:    No family history on file.    Social History:  Marital Status:   [2]  Social History     Tobacco Use     Smoking status: Some Days     Smokeless tobacco: Never   Substance Use Topics     Alcohol use: No     Drug use: No        Medications:    albuterol (PROAIR HFA/PROVENTIL HFA/VENTOLIN HFA) 108 (90 BASE) MCG/ACT Inhaler  albuterol (PROVENTIL) (5 MG/ML) 0.5% neb solution  ASPIRIN EC PO  Atorvastatin Calcium (LIPITOR PO)  budesonide-formoterol (SYMBICORT) 160-4.5 MCG/ACT Inhaler  METOPROLOL TARTRATE PO  morphine sulfate (ROXANOL) 20 mg/mL (HIGH CONC) soln  nicotine (NICODERM CQ) 14 MG/24HR 24 hr patch  nicotine polacrilex (NICORETTE) 2 MG gum  roflumilast (DALIRESP) 500 MCG TABS tablet  tamsulosin (FLOMAX) 0.4 MG capsule  tiotropium (SPIRIVA) 18 MCG capsule  UNABLE TO FIND          Review of Systems   Constitutional: Negative.     HENT: Negative.    Eyes: Negative.    Respiratory: Positive for shortness of breath.    Cardiovascular: Positive for leg swelling.   Gastrointestinal: Negative.    Endocrine: Negative.    Genitourinary: Negative.    Musculoskeletal: Negative.    Skin: Negative.    Allergic/Immunologic: Negative.    Neurological: Negative.    Hematological: Negative.    Psychiatric/Behavioral: The patient is nervous/anxious.        Physical Exam   BP: 129/91  Pulse: 112  Temp: 96.9  F (36.1  C)  Resp: 18  SpO2: 97 %      Physical Exam  Vitals and nursing note reviewed.   Constitutional:       Appearance: Normal appearance.   Cardiovascular:      Rate and Rhythm: Normal rate and regular rhythm.      Heart sounds: Normal heart sounds.   Pulmonary:      Effort: Pulmonary effort is normal.      Breath sounds: No decreased air movement. Examination of the right-middle field reveals decreased breath sounds. Examination of the left-middle field reveals decreased breath sounds. Examination of the right-lower field reveals decreased breath sounds. Examination of the left-lower field reveals decreased breath sounds. Decreased breath sounds present.   Musculoskeletal:      Right lower leg: No edema.      Left lower leg: No edema.   Skin:     General: Skin is warm and dry.   Neurological:      General: No focal deficit present.      Mental Status: He is alert and oriented to person, place, and time.   Psychiatric:         Attention and Perception: Attention and perception normal.         Mood and Affect: Mood is anxious.         Speech: Speech normal.         Behavior: Behavior normal. Behavior is cooperative.         Thought Content: Thought content normal.         Cognition and Memory: Cognition and memory normal.         Judgment: Judgment normal.         ED Course              ED Course as of 06/23/23 1640 Fri Jun 23, 2023   1508 Labs, ECG, chest x-ray ordered while patient in [facility].   1536 In to see patient and history/physical  completed.      POC US ECHO LIMITED    Date/Time: 6/23/2023 4:22 PM    Performed by: Keith Adams APRN CNP  Authorized by: Keith Adams APRN CNP    Procedure details:     Indications: shortness of breath    Comments:      Parasternal long axis, parasternal short axis, apical 4 chamber, subcostal and IVC views were acquired.   Image quality was satisfactory.    Findings:    Abnormal with possibility of slight right ventricular dilation.  No abnormal ventricular function present.  No pericardial effusion present.  IVC collapsibility is normal.    IMPRESSION: Abnormal limited cardiac ultrasound showing mildly dilated right ventricle but normal ventricular function and normal collapsibility of IVC.  No pericardial effusion.        ECG:    ECG competed at 1605 and personally reviewed at 1610 showing sinus tachycardia with ventricular rate in the 100's.  Normal axis.  Possible old anterior infarct age-indeterminate noted.  Abnormal ECG.  When compared to ECG from 1/19/2018, sinus rhythm is now sinus tachycardia.  Ventricular rate has increased by 40 bpm.  Possible anterior infarct age-indeterminate is also noted.         Results for orders placed or performed during the hospital encounter of 06/23/23 (from the past 24 hour(s))   CBC with platelets differential    Narrative    The following orders were created for panel order CBC with platelets differential.  Procedure                               Abnormality         Status                     ---------                               -----------         ------                     CBC with platelets and d...[[id number]]                      Final result                 Please view results for these tests on the individual orders.   Comprehensive metabolic panel   Result Value Ref Range    Sodium 140 136 - 145 mmol/L    Potassium 4.2 3.4 - 5.3 mmol/L    Chloride 98 98 - 107 mmol/L    Carbon Dioxide (CO2) 29 22 - 29 mmol/L    Anion Gap 13 7 - 15 mmol/L    Urea Nitrogen  10.7 8.0 - 23.0 mg/dL    Creatinine 0.79 0.67 - 1.17 mg/dL    Calcium 10.4 (H) 8.8 - 10.2 mg/dL    Glucose 123 (H) 70 - 99 mg/dL    Alkaline Phosphatase 63 40 - 129 U/L    AST 25 0 - 45 U/L    ALT 20 0 - 70 U/L    Protein Total 7.3 6.4 - 8.3 g/dL    Albumin 4.5 3.5 - 5.2 g/dL    Bilirubin Total 1.4 (H) <=1.2 mg/dL    GFR Estimate >90 >60 mL/min/1.73m2   Troponin T, High Sensitivity   Result Value Ref Range    Troponin T, High Sensitivity 17 <=22 ng/L   Magnesium   Result Value Ref Range    Magnesium 1.8 1.7 - 2.3 mg/dL   Nt probnp inpatient (BNP)   Result Value Ref Range    N terminal Pro BNP Inpatient 109 0 - 900 pg/mL   D dimer quantitative   Result Value Ref Range    D-Dimer Quantitative 0.49 0.00 - 0.50 ug/mL FEU    Narrative    This D-dimer assay is intended for use in conjunction with a clinical pretest probability assessment model to exclude pulmonary embolism (PE) and deep venous thrombosis (DVT) in outpatients suspected of PE or DVT. The cut-off value is 0.50 ug/mL FEU.   CBC with platelets and differential   Result Value Ref Range    WBC Count 6.3 4.0 - 11.0 10e3/uL    RBC Count 4.74 4.40 - 5.90 10e6/uL    Hemoglobin 14.6 13.3 - 17.7 g/dL    Hematocrit 44.2 40.0 - 53.0 %    MCV 93 78 - 100 fL    MCH 30.8 26.5 - 33.0 pg    MCHC 33.0 31.5 - 36.5 g/dL    RDW 12.0 10.0 - 15.0 %    Platelet Count 198 150 - 450 10e3/uL    % Neutrophils 66 %    % Lymphocytes 21 %    % Monocytes 10 %    % Eosinophils 2 %    % Basophils 1 %    % Immature Granulocytes 0 %    NRBCs per 100 WBC 0 <1 /100    Absolute Neutrophils 4.2 1.6 - 8.3 10e3/uL    Absolute Lymphocytes 1.3 0.8 - 5.3 10e3/uL    Absolute Monocytes 0.6 0.0 - 1.3 10e3/uL    Absolute Eosinophils 0.1 0.0 - 0.7 10e3/uL    Absolute Basophils 0.1 0.0 - 0.2 10e3/uL    Absolute Immature Granulocytes 0.0 <=0.4 10e3/uL    Absolute NRBCs 0.0 10e3/uL   Extra Tube    Narrative    The following orders were created for panel order Extra Tube.  Procedure                                Abnormality         Status                     ---------                               -----------         ------                     Extra Red Top Tube[[id number]]                               In process                 Extra Heparinized Syringe[[id number]]                        In process                   Please view results for these tests on the individual orders.   XR Chest 2 Views    Narrative    Procedure:XR CHEST 2 VIEWS    Clinical history:Male, 62 years, Shortness of breath    Technique: Two views are submitted.    Comparison: 2/26/2018    Findings: The cardiac silhouette is normal. The pulmonary vasculature  is normal.    The lungs are again hyperinflated however appear to be clear. There is  persistent blunting of the costophrenic angles. Postoperative changes  are now seen within the heart/mediastinum. Bony structures are  unremarkable.      Impression    Impression:   Hyperinflation of the lungs without evidence of CHF, pneumonia or  other acute abnormality.    STELLA ENGLE MD         SYSTEM ID:  [id number]       Medications - No data to display    Assessments & Plan (with Medical Decision Making)     I have reviewed the nursing notes.    I have reviewed the findings, diagnosis, plan and need for follow up with the patient.      Summary:  Patient presents to the ER today for shortness of breath and leg swelling.  Potential diagnosis which have been considered and evaluated include COPD exacerbation, anxiety, MI/NSTEMI, pneumonia, pneumothorax, CHF, PE, as well as others. Many of these have been excluded using the various modalities and assessment as noted on the chart. At the present time, the diagnosis given seems to be the most likely anxiety related shortness of breath.  Upon arrival, vitals signs show blood pressure 129/91 with a pulse of 112.  Temperature 36.1  C.  Respirations 18 oxygenation of 97% on O2 2 L via NC..  The patient is alert and in no distress but very anxious.  Dim lung sounds  throughout.  Tachycardic heart rate but no other abnormality noted.  ECG obtained showing no acute abnormalities.  Troponin negative making ACS unlikely.  Chest x-ray personally reviewed showing hyperinflation but no other acute abnormalities.  POCUS ultrasound of heart conducted showing mild dilation of the right ventricle but no other acute abnormality which is reassuring.  Other lab work obtained showing no acute abnormality.   Patient has very high anxiety per the wife.  Likely anxiety related at this time.  We will discharge patient home to follow-up with PCP.  Return to ER if any new or worsening symptoms.  Patient verbalized understanding agrees with plan of care.  Patient discharged home.        Impression and plan discussed with patient. Questions answered, concerns addressed, indications for urgent re-evaluation reviewed, and  given. Patient/Parent/Caregiver agree with treatment plan and have no further questions at this time.  AVS provided at discharge.    This note was created by the Dragon Voice Dictation System. Inadvertent typographical errors, due to software recognition problems, may still exist.        New Prescriptions    No medications on file       Final diagnoses:   Anxiety state       6/23/2023   [facility]     Keith Adams APRN CNP  06/23/23 1640

## 2025-01-07 ENCOUNTER — TELEPHONE (OUTPATIENT)
Dept: FAMILY MEDICINE | Facility: OTHER | Age: 65
End: 2025-01-07

## 2025-01-07 DIAGNOSIS — Z12.11 SCREENING FOR COLON CANCER: Primary | ICD-10-CM

## 2025-01-07 NOTE — LETTER
January 8, 2025      Keith Ingram  [address] [place] [address]        Dear Keith,       Guide to Your Colonoscopy or Upper GI Endoscopy  Date of Procedure 3/13/25  Dr. TON Funes    Pre-Admission Phone Interview  Date & Time: 3/6/25 @ 9:00AM    Prep Instructions for MiraLAX (No Mag Citrate) have been included.   The medications for your prep can be picked up at your preferred pharmacy.       At [facility], we want to make sure that your endoscopy   is as pleasant as possible. This guide is designed to answer   questions you might have and to walk you through what   you will need to do to prepare for your procedure.  Contact us if you need to cancel your procedure or have any   additional questions.  Day Surgery Reception [phone] Open M-F, 7:00 AM-5:00 PM  After Hours [phone], Ask for House Supervisor  For Cancellations - Appointments [phone]      Sincerely,        Miya Hogue MD    Electronically signed

## 2025-01-07 NOTE — TELEPHONE ENCOUNTER
Screening Questions for the Scheduling of Screening Colonoscopies     (If Colonoscopy is diagnostic, Provider should review the chart before scheduling.)    Are you younger than 50 or older than 80?  No    Do you take aspirin or fish oil?  Yes:  (if yes, tell patient to stop 1 week prior to Colonoscopy)    Do you take warfarin (Coumadin), clopidogrel (Plavix), apixaban (Eliquis), dabigatram (Pradaxa), rivaroxaban (Xarelto) or any blood thinner? No    Do you use oxygen at home?  Yes    Do you have kidney disease? No    Are you on dialysis? No    Have you had a stroke or heart attack in the last year? No    Have you had a stent in your heart or any blood vessel in the last year? No    Have you had a transplant of any organ?  No    Have you had a colonoscopy or upper endoscopy (EGD) before?  YES. Date-.         Date of scheduled Colonoscopy: 3/13/25    Provider: Dr. TON Funes     Pharmacy [facility]

## 2025-01-08 ENCOUNTER — HOSPITAL ENCOUNTER (OUTPATIENT)
Facility: HOSPITAL | Age: 65
End: 2025-01-08
Attending: SURGERY | Admitting: SURGERY
Payer: COMMERCIAL

## 2025-01-08 RX ORDER — BISACODYL 5 MG/1
TABLET, DELAYED RELEASE ORAL
Qty: 4 TABLET | Refills: 0 | Status: SHIPPED | OUTPATIENT
Start: 2025-01-08

## 2025-01-08 RX ORDER — POLYETHYLENE GLYCOL 3350 17 G/17G
POWDER, FOR SOLUTION ORAL
Qty: 238 G | Refills: 0 | Status: SHIPPED | OUTPATIENT
Start: 2025-01-08

## 2025-01-08 NOTE — TELEPHONE ENCOUNTER
Patient scheduled for screening colonoscopy on 3/13/25  with Dr. Marin Bermudez bowel preparation and script sent to  [facility]  .   Patient does  need a pre-op. Guide to your Colonoscopy or Upper GI Endoscopy and prep instructions sent to patient via US Mail.

## 2025-03-04 RX ORDER — CETIRIZINE HYDROCHLORIDE 10 MG/1
1 TABLET ORAL
COMMUNITY
Start: 2024-08-06 | End: 2025-03-06

## 2025-03-06 RX ORDER — DIPHENHYDRAMINE HCL 25 MG
25 CAPSULE ORAL EVERY 6 HOURS PRN
COMMUNITY
End: 2025-03-11 | Stop reason: HOSPADM

## 2025-03-10 ENCOUNTER — TELEPHONE (OUTPATIENT)
Dept: SURGERY | Facility: OTHER | Age: 65
End: 2025-03-10

## 2025-03-10 ENCOUNTER — ANESTHESIA EVENT (OUTPATIENT)
Dept: SURGERY | Facility: HOSPITAL | Age: 65
End: 2025-03-10

## 2025-03-10 ASSESSMENT — ENCOUNTER SYMPTOMS: DYSRHYTHMIAS: 1

## 2025-03-10 ASSESSMENT — COPD QUESTIONNAIRES: COPD: 1

## 2025-03-10 NOTE — TELEPHONE ENCOUNTER
Sent to [facility] Nurse  Updated Surg book  Pia Funes is cancelling this procedure for now. Needs to be seen in the office tomorrow to further discuss current condition.    Thank you!    CHERI Julien      Note: appt with Dr. Funes 3/11/25

## 2025-03-10 NOTE — ANESTHESIA PREPROCEDURE EVALUATION
Anesthesia Pre-Procedure Evaluation    Patient: Keith Ingram   MRN: [id number] : 1960        Procedure : Procedure(s):  COLONOSCOPY          Past Medical History:   Diagnosis Date     COPD (chronic obstructive pulmonary disease) (H)      ETOH abuse      Heart murmur     moderate mitral regurgitation     Hypertension      Sleep apnea       Past Surgical History:   Procedure Laterality Date     COLONOSCOPY N/A 2018    Procedure: COLONOSCOPY;  COLONOSCOPY with Polypectomy and Resolution Clipping;  Surgeon: Young Souza MD;  Location: [facility]     COLONOSCOPY - HIM SCAN  2007    Colonoscopy, REMV LESN, forcep/cautery  ([facility])      Allergies   Allergen Reactions     Lisinopril Other (See Comments)     Dizziness        Social History     Tobacco Use     Smoking status: Some Days     Smokeless tobacco: Current     Types: Chew   Substance Use Topics     Alcohol use: No      Wt Readings from Last 1 Encounters:   22 60.5 kg (133 lb 6.4 oz)        Anesthesia Evaluation            ROS/MED HX  ENT/Pulmonary: Comment: He has a history of centrilobular emphysema with JAYASHREE/COPD overlap syndrome with associated ambulatory oxygen desaturations and extreme dyspnea with any exertion.  He is on oxygen 2-4 L via nasal cannula chronically, and follows with pulmonology for management of his inhalers.  He also is on Roxanol/liquid morphine palliatively for air hunger.  Present respiratory condition is stable.     (+) sleep apnea,                         COPD,              Neurologic:       Cardiovascular:     (+)  hypertension- -   -  - -                        dysrhythmias, a-fib,  valvular problems/murmurs  MVR.    Previous cardiac testing   Echo: Date: Results:    Stress Test:  Date: Results:    ECG Reviewed:  Date: 23 Results:    Possible L atrial enlargement  Septal infarct  Cath:  Date: Results:      METS/Exercise Tolerance:     Hematologic:       Musculoskeletal:    "(+)  arthritis,             GI/Hepatic:       Renal/Genitourinary:       Endo:       Psychiatric/Substance Use:     (+)   alcohol abuse      Infectious Disease:       Malignancy:       Other:               OUTSIDE LABS:  CBC:   Lab Results   Component Value Date    WBC 6.3 06/23/2023    WBC 6.8 02/26/2018    HGB 14.6 06/23/2023    HGB 16.2 02/26/2018    HCT 44.2 06/23/2023    HCT 48.0 02/26/2018     06/23/2023     (L) 02/26/2018     BMP:   Lab Results   Component Value Date     06/23/2023     02/26/2018    POTASSIUM 4.2 06/23/2023    POTASSIUM 4.4 02/26/2018    CHLORIDE 98 06/23/2023    CHLORIDE 97 02/26/2018    CO2 29 06/23/2023    CO2 33 (H) 02/26/2018    BUN 10.7 06/23/2023    BUN 12 02/26/2018    CR 0.79 06/23/2023    CR 0.90 02/26/2018     (H) 06/23/2023     (H) 02/26/2018     COAGS: No results found for: \"PTT\", \"INR\", \"FIBR\"  POC: No results found for: \"BGM\", \"HCG\", \"HCGS\"  HEPATIC:   Lab Results   Component Value Date    ALBUMIN 4.5 06/23/2023    PROTTOTAL 7.3 06/23/2023    ALT 20 06/23/2023    AST 25 06/23/2023    ALKPHOS 63 06/23/2023    BILITOTAL 1.4 (H) 06/23/2023     OTHER:   Lab Results   Component Value Date    FRANK 10.4 (H) 06/23/2023    MAG 1.8 06/23/2023       Anesthesia Plan    ASA Status:  4, emergent                     Consents            Postoperative Care            Comments:    Other Comments: Palliative liquid morphine for air hunger  Sent message to Marin/Kya about patient and they will review chart as they have never seen patient from as far as I can tell GP           ELLIS Lockwood CRNA    I have reviewed the pertinent notes and labs in the chart from the past 30 days and (re)examined the patient.  Any updates or changes from those notes are reflected in this note.    Clinically Significant Risk Factors Present on Admission                   # Hypertension: Noted on problem list                        "

## 2025-03-11 ENCOUNTER — OFFICE VISIT (OUTPATIENT)
Dept: SURGERY | Facility: OTHER | Age: 65
End: 2025-03-11
Attending: SURGERY
Payer: COMMERCIAL

## 2025-03-11 VITALS
HEIGHT: 70 IN | SYSTOLIC BLOOD PRESSURE: 112 MMHG | HEART RATE: 87 BPM | DIASTOLIC BLOOD PRESSURE: 80 MMHG | TEMPERATURE: 96.7 F | BODY MASS INDEX: 17.32 KG/M2 | OXYGEN SATURATION: 98 % | RESPIRATION RATE: 18 BRPM | WEIGHT: 121 LBS

## 2025-03-11 DIAGNOSIS — Z86.0100 HISTORY OF COLONIC POLYPS: Primary | ICD-10-CM

## 2025-03-11 PROCEDURE — G0463 HOSPITAL OUTPT CLINIC VISIT: HCPCS

## 2025-03-11 PROCEDURE — G0463 HOSPITAL OUTPT CLINIC VISIT: HCPCS | Mod: 25

## 2025-03-11 ASSESSMENT — PAIN SCALES - GENERAL: PAINLEVEL_OUTOF10: NO PAIN (0)

## 2025-03-11 NOTE — PROGRESS NOTES
CLINIC NOTE - CONSULT  3/11/2025    Patient:Keith Ingram  Referring Physician: Prosper Magaña    Reason for Referral: Screening colonoscopy and History of Colon Polyps    This is a 64 year old male with a need of a colonoscopy for Screening colonoscopy and History of Colon Cancer.     Personal history of colon cancer : NO   Family history of colon cancer : NO   Personal history of polyps : YES   Family history of polyps : NO   History of Inflammatory Bowel Disease : NO   History of rectal bleeding : NO   Changes in bowel habits : NO   Last colonoscopy : 4 years    Past Medical History:  Past Medical History:   Diagnosis Date    COPD (chronic obstructive pulmonary disease) (H)     ETOH abuse     Heart murmur     moderate mitral regurgitation    Hypertension     Sleep apnea        Past Surgical History:  Past Surgical History:   Procedure Laterality Date    COLONOSCOPY N/A 1/19/2018    Procedure: COLONOSCOPY;  COLONOSCOPY with Polypectomy and Resolution Clipping;  Surgeon: Young Souza MD;  Location: [facility]    COLONOSCOPY - HIM SCAN  09/06/2007    Colonoscopy, REMV LESN, forcep/cautery  ([facility])       Family History History:  No family history on file.    History of Tobacco Use:  History   Smoking Status    Former    Types: Cigarettes   Smokeless Tobacco    Current    Types: Chew       Current Medications:  Current Outpatient Medications   Medication Sig Dispense Refill    albuterol (PROAIR HFA/PROVENTIL HFA/VENTOLIN HFA) 108 (90 BASE) MCG/ACT Inhaler Inhale 2 puffs into the lungs every 6 hours      albuterol (PROVENTIL) (5 MG/ML) 0.5% neb solution Take 2.5 mg by nebulization every 6 hours as needed for wheezing or shortness of breath / dyspnea      ASPIRIN EC PO Take 81 mg by mouth daily      Atorvastatin Calcium (LIPITOR PO) Take 20 mg by mouth daily      bisacodyl (DULCOLAX) 5 MG EC tablet Take two (2) tablet at 4 pm the day before your procedure.  If your  "procedure is before 11 am, take two (2) additional tablets at 8 pm.  If your procedure is after 11 am, take two (2) additional tablets at 6 am. For additional instructions refer to your colonoscopy prep instructions. 4 tablet 0    budesonide-formoterol (SYMBICORT) 160-4.5 MCG/ACT Inhaler Inhale 2 puffs into the lungs 2 times daily      METOPROLOL TARTRATE PO Take 25 mg by mouth 2 times daily      morphine sulfate (ROXANOL) 20 mg/mL (HIGH CONC) soln Take 2.5 mg by mouth every 2 hours as needed for shortness of breath or breakthrough pain      polyethylene glycol (MIRALAX) 17 GM/Dose powder - Mix 8.3 oz of miralax powder with 64 oz of gatorade or other sport drink (no red or purple).  For additional instructions refer to your colonoscopy prep instructions. 238 g 0    roflumilast (DALIRESP) 500 MCG TABS tablet Take 500 mcg by mouth daily      tamsulosin (FLOMAX) 0.4 MG capsule Take 0.4 mg by mouth daily      tiotropium (SPIRIVA) 18 MCG capsule Inhale 18 mcg into the lungs daily      UNABLE TO FIND Oxygen at 2 l/m per n/c @@ HS daily         Allergies:  Allergies   Allergen Reactions    Lisinopril Other (See Comments)     Dizziness         ROS:  Pertinent items are noted in HPI.  All other systems are negative.    PHYSICAL EXAM:     Vital signs: /80 (BP Location: Right arm, Cuff Size: Adult Regular)   Pulse 87   Temp (!) 96.7  F (35.9  C) (Tympanic)   Resp 18   Ht 1.778 m (5' 10\")   Wt 54.9 kg (121 lb)   SpO2 98%   BMI 17.36 kg/m     Weight: [unfilled]   BMI: Body mass index is 17.36 kg/m .   General: cooperative, in no acute distress, cachectic   Skin: Cyanotic   HEENT: PERRLA and EOMI   Neck: supple     ASSESSMENT:      64 year old male with a need of a colonoscopy for Screening colonoscopy and History of Colon Polyps.    PLAN:   I had a long discussion with him and his wife.  The patient does have end-stage COPD is oxygen dependent and is on liquid morphine for oxygen deprivation symptoms.  He did have " surgery approximately 2 years ago for repair of a hernia which he did relatively well.  He was seen by pulmonology last year at that point they stated that his pulmonary function had declined significantly.  He is scheduled to see pulmonology again.      He does have an indication for colonoscopy with his history of polyps, with pathology showing adenomatous polyps.  But I do feel that the risks of the procedure far outweigh any potential benefit.  If there was something that required surgical intervention, either a large mass or a complication of the procedure, he would be a poor candidate for surgical intervention or general anesthetic.  More than likely he would not come off the ventilator.     This was all explained to him and to his significant other that was with him.  All of their questions were answered.  At the end of that conversation I did state that if he desired to proceed on with a colonoscopy we could go ahead and do it.  After that discussion the patient and significant other felt that it was better not to proceed with colonoscopy at this time.  I think that is very reasonable.  I did explain that I would be very glad to reconsider at any time.  They will follow up as needed.

## 2025-03-13 ENCOUNTER — ANESTHESIA (OUTPATIENT)
Dept: SURGERY | Facility: HOSPITAL | Age: 65
End: 2025-03-13

## (undated) DEVICE — FORCEP-HOT BIOPSY ALLIGATOR JAW

## (undated) DEVICE — CANISTER-SUCTION 2000CC

## (undated) DEVICE — CAUTERY PAD-POLYHESIVE II ADULT

## (undated) DEVICE — TRAP-POLYP E-TRAP

## (undated) DEVICE — SNARE-ROTATABLE 20MM MINI OVAL

## (undated) DEVICE — IRRIGATION-H2O 1000ML

## (undated) DEVICE — TUBING-SUCTION 20FT

## (undated) DEVICE — CLIP-RESOLUTION CLIPPING DEVICE

## (undated) RX ORDER — PROPOFOL 10 MG/ML
INJECTION, EMULSION INTRAVENOUS
Status: DISPENSED
Start: 2018-01-19

## (undated) RX ORDER — LIDOCAINE HYDROCHLORIDE 20 MG/ML
INJECTION, SOLUTION EPIDURAL; INFILTRATION; INTRACAUDAL; PERINEURAL
Status: DISPENSED
Start: 2018-01-19